# Patient Record
Sex: FEMALE | Race: WHITE | NOT HISPANIC OR LATINO | Employment: FULL TIME | URBAN - METROPOLITAN AREA
[De-identification: names, ages, dates, MRNs, and addresses within clinical notes are randomized per-mention and may not be internally consistent; named-entity substitution may affect disease eponyms.]

---

## 2017-09-13 ENCOUNTER — TELEPHONE (OUTPATIENT)
Dept: HEMATOLOGY/ONCOLOGY | Facility: CLINIC | Age: 52
End: 2017-09-13

## 2017-09-13 NOTE — TELEPHONE ENCOUNTER
----- Message from Yue Mark sent at 9/13/2017  8:09 AM CDT -----  Patient called in stating that she doesn't have an appointment until 1/15/18. It is in allscripts but not in Epic. She noticed a lump on the right side of her belly button. She doesn't know if she should get in sooner ? There is nothing in [facility] on this patient so I pulled the chart and attached a copy of this to it. Please advise and call patient at [phone]. Thanks

## 2017-09-19 ENCOUNTER — OFFICE VISIT (OUTPATIENT)
Dept: HEMATOLOGY/ONCOLOGY | Facility: CLINIC | Age: 52
End: 2017-09-19
Payer: OTHER GOVERNMENT

## 2017-09-19 VITALS
SYSTOLIC BLOOD PRESSURE: 131 MMHG | DIASTOLIC BLOOD PRESSURE: 76 MMHG | HEART RATE: 96 BPM | RESPIRATION RATE: 18 BRPM | TEMPERATURE: 99 F | WEIGHT: 173 LBS

## 2017-09-19 DIAGNOSIS — Z85.3 HISTORY OF BREAST CANCER: Chronic | ICD-10-CM

## 2017-09-19 DIAGNOSIS — R19.05 PERIUMBILICAL MASS: ICD-10-CM

## 2017-09-19 PROCEDURE — 99214 OFFICE O/P EST MOD 30 MIN: CPT | Mod: ,,, | Performed by: INTERNAL MEDICINE

## 2017-09-19 RX ORDER — MULTIVITAMIN
1 TABLET ORAL DAILY
COMMUNITY

## 2017-09-19 RX ORDER — FERROUS SULFATE, DRIED 160(50) MG
1 TABLET, EXTENDED RELEASE ORAL 2 TIMES DAILY WITH MEALS
COMMUNITY

## 2017-09-19 NOTE — ASSESSMENT & PLAN NOTE
Unsure of cause of this.  Will get ultrasound to better characterize.  Does seem benign to me however.  Will see again in 3 months to examine.

## 2017-09-19 NOTE — ASSESSMENT & PLAN NOTE
Breast exam is negative bilaterally. FREDERICK at this time.  Will continue yearly follow up for this.

## 2017-09-19 NOTE — PROGRESS NOTES
PROGRESS NOTE    Subjective:       Patient ID: Enma Navarro is a 52 y.o. female.    Chief Complaint:  Follow-up (patient has hard knot on center of abd)  new nodule on abdomen    History of Present Illness:   Enma Navarro is a 52 y.o. female who presents after she found a nodule in the area of the umbilicus.  This lesion is not painful and has not been growing.         Family and Social history reviewed and is unchanged from 7/29/2014.       ROS:  Review of Systems   Constitutional: Negative for fever.   Respiratory: Negative for shortness of breath.    Cardiovascular: Negative for chest pain and leg swelling.   Gastrointestinal: Negative for abdominal pain and blood in stool.   Genitourinary: Negative for hematuria.   Skin: Negative for rash.          Current Outpatient Prescriptions:     calcium-vitamin D3 (CALCIUM 500 + D) 500 mg(1,250mg) -200 unit per tablet, Take 1 tablet by mouth 2 (two) times daily with meals., Disp: , Rfl:     multivitamin (ONE DAILY MULTIVITAMIN) per tablet, Take 1 tablet by mouth once daily., Disp: , Rfl:         Objective:       Physical Examination:     /76   Pulse 96   Temp 98.7 °F (37.1 °C) (Oral)   Resp 18   Wt 78.5 kg (173 lb)     Physical Exam   Constitutional: She appears well-developed and well-nourished.   HENT:   Head: Normocephalic and atraumatic.   Right Ear: External ear normal.   Left Ear: External ear normal.   Mouth/Throat: Oropharynx is clear and moist.   Eyes: Conjunctivae are normal. Pupils are equal, round, and reactive to light.   Neck: No tracheal deviation present. No thyromegaly present.   Cardiovascular: Normal rate, regular rhythm and normal heart sounds.    Pulmonary/Chest: Effort normal and breath sounds normal.       Abdominal: Soft. Bowel sounds are normal. She exhibits no distension and no mass. There is no tenderness.       Musculoskeletal: She exhibits no edema.   Neurological:    Neuro intact througout   Skin: No rash noted.   Psychiatric: She has a normal mood and affect. Her behavior is normal. Judgment and thought content normal.       Labs:   No results found for this or any previous visit (from the past 336 hour(s)).  CMP  No results found for: NA, K, CL, CO2, GLU, BUN, CREATININE, CALCIUM, PROT, ALBUMIN, BILITOT, ALKPHOS, AST, ALT, ANIONGAP, ESTGFRAFRICA, EGFRNONAA  No results found for: CEA  No results found for: PSA        Assessment/Plan:     Problem List Items Addressed This Visit     History of breast cancer (Chronic)     Breast exam is negative bilaterally. FREDERICK at this time.  Will continue yearly follow up for this.           Periumbilical mass     Unsure of cause of this.  Will get ultrasound to better characterize.  Does seem benign to me however.  Will see again in 3 months to examine.          Relevant Orders    US Abdomen Limited      Other Visit Diagnoses    None.         Discussion:     Return in about 3 months (around 12/19/2017).      Electronically signed by Delbert Roca

## 2017-09-25 ENCOUNTER — TELEPHONE (OUTPATIENT)
Dept: HEMATOLOGY/ONCOLOGY | Facility: CLINIC | Age: 52
End: 2017-09-25

## 2017-09-25 DIAGNOSIS — R19.05 PERIUMBILICAL MASS: Primary | ICD-10-CM

## 2017-09-25 NOTE — TELEPHONE ENCOUNTER
Message left on VM for pt to give office a call today regarding scan [id number] we close today around 430 pm.

## 2017-09-25 NOTE — TELEPHONE ENCOUNTER
----- Message from Delbert Edgar MD sent at 9/25/2017  9:25 AM CDT -----  Please call the patient regarding her abnormal result. 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

## 2017-09-25 NOTE — TELEPHONE ENCOUNTER
Spoke to patient with 's evaluation of the U/S she had done today. A ct is recommended. She is agreeable. Will set up first available.    09/26/2017  placed order for CT to be done. Order sent. [facility] will call patient to schedule.

## 2017-09-29 ENCOUNTER — TELEPHONE (OUTPATIENT)
Dept: HEMATOLOGY/ONCOLOGY | Facility: CLINIC | Age: 52
End: 2017-09-29

## 2017-09-29 NOTE — TELEPHONE ENCOUNTER
----- Message from Delbert Edgar MD sent at 9/29/2017  3:07 PM CDT -----  Please call patient and notify that results are unremarkable. Looks like benign fatty tumor.

## 2017-09-29 NOTE — TELEPHONE ENCOUNTER
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 Keep follow up appt as scheduled in Dec. 2017. Patient voiced understanding

## 2018-02-06 ENCOUNTER — OFFICE VISIT (OUTPATIENT)
Dept: HEMATOLOGY/ONCOLOGY | Facility: CLINIC | Age: 53
End: 2018-02-06
Payer: OTHER GOVERNMENT

## 2018-02-06 VITALS
SYSTOLIC BLOOD PRESSURE: 123 MMHG | WEIGHT: 172.69 LBS | TEMPERATURE: 98 F | HEART RATE: 90 BPM | HEIGHT: 63 IN | RESPIRATION RATE: 18 BRPM | BODY MASS INDEX: 30.6 KG/M2 | DIASTOLIC BLOOD PRESSURE: 86 MMHG

## 2018-02-06 DIAGNOSIS — R19.05 PERIUMBILICAL MASS: ICD-10-CM

## 2018-02-06 DIAGNOSIS — Z85.3 HISTORY OF BREAST CANCER: Chronic | ICD-10-CM

## 2018-02-06 PROCEDURE — 3008F BODY MASS INDEX DOCD: CPT | Mod: ,,, | Performed by: INTERNAL MEDICINE

## 2018-02-06 PROCEDURE — 99213 OFFICE O/P EST LOW 20 MIN: CPT | Mod: ,,, | Performed by: INTERNAL MEDICINE

## 2018-02-06 NOTE — LETTER
February 6, 2018      Rosa Edgar MD  [address]  [address]  [place] [address]           [facility]  [address]  [address]  [place] [address]  Phone: [phone]  Fax: [phone]          Patient: Enma Navarro   MR Number: [id number]   YOB: 1965   Date of Visit: 2/6/2018       Dear Dr. Rosa Edgar:    Thank you for referring Enma Navarro to me for evaluation. Attached you will find relevant portions of my assessment and plan of care.    If you have questions, please do not hesitate to call me. I look forward to following Enma Navarro along with you.    Sincerely,    Delbert Edgar MD    Enclosure  CC:  No Recipients    If you would like to receive this communication electronically, please contact [email or web address] or [phone] to request more information on [employer] access.    For providers and/or their staff who would like to refer a patient to [facility], please contact us through our one-stop-shop provider referral line, [facility], at [phone].    If you feel you have received this communication in error or would no longer like to receive these types of communications, please e-mail [email or web address]

## 2018-02-06 NOTE — PROGRESS NOTES
"                                                         PROGRESS NOTE    Subjective:       Patient ID: Enma Navarro is a 52 y.o. female.    Chief Complaint:  Follow-up and Results (scan in epic)  breast cancer follow up.     History of Present Illness:   Enma Navarro is a 52 y.o. female who presents for routine follow up of breast cancer. Has a small lump at umbilicus.     Ultrasound and CT were negative. No changes. No new complaints.   Family and Social history reviewed and is unchanged from 7/29/2014.       ROS:  Review of Systems   Constitutional: Negative for fever.   Respiratory: Negative for shortness of breath.    Cardiovascular: Negative for chest pain and leg swelling.   Gastrointestinal: Negative for abdominal pain and blood in stool.   Genitourinary: Negative for hematuria.   Skin: Negative for rash.          Current Outpatient Prescriptions:     calcium-vitamin D3 (CALCIUM 500 + D) 500 mg(1,250mg) -200 unit per tablet, Take 1 tablet by mouth 2 (two) times daily with meals., Disp: , Rfl:     multivitamin (ONE DAILY MULTIVITAMIN) per tablet, Take 1 tablet by mouth once daily., Disp: , Rfl:         Objective:       Physical Examination:     /86   Pulse 90   Temp 98.4 °F (36.9 °C)   Resp 18   Ht 5' 3" (1.6 m)   Wt 78.3 kg (172 lb 11.2 oz)   BMI 30.59 kg/m²     Physical Exam   Constitutional: She appears well-developed and well-nourished.   HENT:   Head: Normocephalic and atraumatic.   Right Ear: External ear normal.   Left Ear: External ear normal.   Mouth/Throat: Oropharynx is clear and moist.   Eyes: Conjunctivae are normal. Pupils are equal, round, and reactive to light.   Neck: No tracheal deviation present. No thyromegaly present.   Cardiovascular: Normal rate, regular rhythm and normal heart sounds.    Pulmonary/Chest: Effort normal and breath sounds normal.       Abdominal: Soft. Bowel sounds are normal. She exhibits no distension and no mass. There is no tenderness. "       Musculoskeletal: She exhibits no edema.   Neurological:   Neuro intact througout   Skin: No rash noted.   Psychiatric: She has a normal mood and affect. Her behavior is normal. Judgment and thought content normal.       Labs:   No results found for this or any previous visit (from the past 336 hour(s)).  CMP  No results found for: NA, K, CL, CO2, GLU, BUN, CREATININE, CALCIUM, PROT, ALBUMIN, BILITOT, ALKPHOS, AST, ALT, ANIONGAP, ESTGFRAFRICA, EGFRNONAA  No results found for: CEA  No results found for: PSA        Assessment/Plan:     Problem List Items Addressed This Visit     History of breast cancer (Chronic)     Patient is doing well.  FREDERICK, exam is negative.  Will continue yearly follow up otherwise.          Periumbilical mass     Work up was negative. Observe.                Discussion:     Follow-up in about 1 year (around 2/6/2019).      Electronically signed by Delbert Roca

## 2018-08-07 ENCOUNTER — OFFICE VISIT (OUTPATIENT)
Dept: FAMILY MEDICINE | Facility: CLINIC | Age: 53
End: 2018-08-07
Payer: OTHER GOVERNMENT

## 2018-08-07 VITALS
RESPIRATION RATE: 16 BRPM | SYSTOLIC BLOOD PRESSURE: 136 MMHG | DIASTOLIC BLOOD PRESSURE: 80 MMHG | HEIGHT: 63 IN | OXYGEN SATURATION: 97 % | WEIGHT: 175.5 LBS | HEART RATE: 84 BPM | BODY MASS INDEX: 31.1 KG/M2

## 2018-08-07 DIAGNOSIS — Z12.11 SCREEN FOR COLON CANCER: ICD-10-CM

## 2018-08-07 DIAGNOSIS — R53.83 FATIGUE, UNSPECIFIED TYPE: ICD-10-CM

## 2018-08-07 DIAGNOSIS — E28.319 PREMATURE MENOPAUSE: ICD-10-CM

## 2018-08-07 DIAGNOSIS — Z12.39 SCREENING FOR BREAST CANCER: ICD-10-CM

## 2018-08-07 DIAGNOSIS — Z00.00 ANNUAL PHYSICAL EXAM: Primary | ICD-10-CM

## 2018-08-07 DIAGNOSIS — Z85.3 HISTORY OF BREAST CANCER: Chronic | ICD-10-CM

## 2018-08-07 PROBLEM — Z90.710 HISTORY OF TOTAL HYSTERECTOMY: Status: ACTIVE | Noted: 2018-08-07

## 2018-08-07 PROCEDURE — 99396 PREV VISIT EST AGE 40-64: CPT | Mod: ,,, | Performed by: INTERNAL MEDICINE

## 2018-08-07 NOTE — PATIENT INSTRUCTIONS
Bone Density Study     The technologist will adjust your body and the scanner during the exam.     A bone density study helps diagnose osteoporosis (bone thinning). Scans of your lower back, hip, or forearm are taken to measure the amount of calcium (density) in your bones. Calcium is the mineral that makes up your bones.     Tell the technologist   Tell the technologist if you:  · Are pregnant or think you could be  · Have any metal in the part of your body being imaged, such as a hip replacement  · Have had a recent nuclear medicine scan,  CT scan with oral contrast, or an X-ray test with oral contrast, such as a barium enema, barium swallow, or upper GI  · Have a severely curved spine, have had spinal surgery, have a history of spinal or hip fractures, or cant lie on your back      Before your test  · Wear clothing without metal closures, such as zippers or metal buttons.  · Bring a list of medications that you take.  During your test  · You will lie on a table or sit.  · Your lower legs may be raised on a platform.  · A scanner arm moves back and forth over the part of your body being scanned.  · Remain still and do not talk during the scan.  · Follow instructions to help prevent the need for a second exam.  After your test  · You may need to wait briefly while the images are reviewed.  · Your healthcare provider will discuss the test results with you during a follow-up visit or over the phone.  Your next appointment is: _________________  Date Last Reviewed: 2/1/2017  © 2174-1826 [employer]. [address] [place] [address], [place] [address]. All rights reserved. This information is not intended as a substitute for professional medical care. Always follow your healthcare professional's instructions.

## 2018-08-07 NOTE — PROGRESS NOTES
Subjective:       Patient ID: Enma Navarro is a 53 y.o. female.    Chief Complaint: Annual Exam    53-year-old female who is here for an annual physical exam. The last time I saw her was 2 years ago. She is very healthy but has a history of breast cancer, and early stages that was estrogen positive back in 2008. She had a bilateral mastectomy with abdominal fat used as breast. However her nipples are still present. She sees her oncologist every year does a breast exam. She hasn't had a mammogram in quite some time and our records have 2012. She also had a complete total hysterectomy in 2010. She has not yet had a colonoscopy and is interested in having one as well. For her breast cancer she was on 5 years of first tamoxifen and then Arimidex, but she has never had a bone mineral density either. She has no complaints today and does take a multivitamin with calcium and vitamin D. She has no family history of colon cancer.      Review of Systems   Constitutional: Negative for activity change, appetite change, chills, diaphoresis, fatigue, fever and unexpected weight change.   HENT: Negative for congestion, ear pain, mouth sores, postnasal drip, sinus pressure, sore throat and trouble swallowing.    Eyes: Negative for pain, redness and visual disturbance.   Respiratory: Negative for apnea, cough, chest tightness, shortness of breath and wheezing.    Cardiovascular: Negative for chest pain, palpitations and leg swelling.   Gastrointestinal: Negative for abdominal distention, abdominal pain, blood in stool, constipation, diarrhea, nausea and vomiting.   Endocrine: Negative for cold intolerance, polydipsia, polyphagia and polyuria.   Genitourinary: Negative for difficulty urinating, dysuria, flank pain, frequency, hematuria, menstrual problem, pelvic pain and urgency.   Musculoskeletal: Negative for arthralgias, back pain, joint swelling and neck pain.   Skin: Negative for color change, rash and wound.   Neurological:  Negative for dizziness, tremors, seizures, syncope, weakness, light-headedness, numbness and headaches.   Hematological: Negative for adenopathy. Does not bruise/bleed easily.   Psychiatric/Behavioral: Negative for confusion, decreased concentration, dysphoric mood, hallucinations, self-injury, sleep disturbance and suicidal ideas. The patient is not nervous/anxious.        Past Medical History:   Diagnosis Date    Allergy     Cancer     breast cancer       Past Surgical History:   Procedure Laterality Date    BREAST SURGERY      double mastectomy w/reconstruction     SECTION      2 c-sections    FINGER SURGERY      left index finger    HYSTERECTOMY      TONSILLECTOMY         Family History   Problem Relation Age of Onset    Stroke Mother     Depression Mother     Hypertension Father        Social History     Social History    Marital status:      Spouse name: N/A    Number of children: N/A    Years of education: N/A     Social History Main Topics    Smoking status: Never Smoker    Smokeless tobacco: Never Used    Alcohol use No    Drug use: No    Sexual activity: Yes     Other Topics Concern    None     Social History Narrative    None       Current Outpatient Prescriptions   Medication Sig Dispense Refill    calcium-vitamin D3 (CALCIUM 500 + D) 500 mg(1,250mg) -200 unit per tablet Take 1 tablet by mouth 2 (two) times daily with meals.      ergocalciferol, vitamin D2, (VITAMIN D ORAL) Take by mouth.      multivitamin (ONE DAILY MULTIVITAMIN) per tablet Take 1 tablet by mouth once daily.       No current facility-administered medications for this visit.        Review of patient's allergies indicates:   Allergen Reactions    Penicillins        Objective:      Physical Exam   Constitutional: She is oriented to person, place, and time. She appears well-developed and well-nourished.   HENT:   Head: Normocephalic and atraumatic.   Eyes: Right eye exhibits no discharge. Left eye  exhibits no discharge. No scleral icterus.   Neck: Neck supple. No JVD present.   Cardiovascular: Normal rate and regular rhythm.  Exam reveals no gallop and no friction rub.    No murmur heard.  Pulmonary/Chest: Effort normal and breath sounds normal. She has no wheezes. She has no rales.   Abdominal: Soft. Bowel sounds are normal. She exhibits no distension. There is no tenderness.   Musculoskeletal: She exhibits no edema or tenderness.   Lymphadenopathy:     She has no cervical adenopathy.   Neurological: She is alert and oriented to person, place, and time. She has normal reflexes. No cranial nerve deficit.   Skin: No rash noted. No erythema.   Psychiatric: She has a normal mood and affect.   Nursing note and vitals reviewed.      Assessment:       1. Annual physical exam    2. Premature menopause    3. History of breast cancer-2008 ; s/p bilateral masectomies with reconstruction form abdominal fat-s/p tamifen and arimedex    4. Screening for breast cancer    5. Screen for colon cancer    6. Fatigue, unspecified type        Plan:       Annual physical exam  -     Comprehensive metabolic panel; Future; Expected date: 08/07/2018  -     Lipid panel; Future; Expected date: 08/07/2018  -     Cancel: TSH; Future; Expected date: 08/07/2018    Premature menopause  -     DXA Bone Density Spine And Hip    History of breast cancer-2008 ; s/p bilateral masectomies with reconstruction form abdominal fat-s/p tamifen and arimedex-We will get a screening mammogram as her breast tissue is all abdominal fat and she is over 12 years out. The only reason to get one at all she still has her nipples and areola    Screening for breast cancer  -     Mammo Digital Screening Bilat without CA    Screen for colon cancer  -     Cologuard Screening (Multitarget Stool DNA)    Fatigue, unspecified type  -     CBC auto differential; Future; Expected date: 08/07/2018  -     Cancel: TSH; Future; Expected date: 08/07/2018  -     TSH; Future;  Expected date: 08/07/2018

## 2018-08-13 ENCOUNTER — TELEPHONE (OUTPATIENT)
Dept: FAMILY MEDICINE | Facility: CLINIC | Age: 53
End: 2018-08-13

## 2018-08-13 NOTE — TELEPHONE ENCOUNTER
----- Message from Rosa Edgar MD sent at 8/13/2018  1:48 PM CDT -----  Osteoporosis on bmd - at 53 , let her make ov to discuss

## 2018-08-16 ENCOUNTER — OFFICE VISIT (OUTPATIENT)
Dept: FAMILY MEDICINE | Facility: CLINIC | Age: 53
End: 2018-08-16
Payer: OTHER GOVERNMENT

## 2018-08-16 VITALS
BODY MASS INDEX: 30.27 KG/M2 | RESPIRATION RATE: 16 BRPM | SYSTOLIC BLOOD PRESSURE: 132 MMHG | WEIGHT: 170.81 LBS | HEIGHT: 63 IN | OXYGEN SATURATION: 97 % | HEART RATE: 94 BPM | DIASTOLIC BLOOD PRESSURE: 88 MMHG

## 2018-08-16 DIAGNOSIS — E55.9 VITAMIN D DEFICIENCY: ICD-10-CM

## 2018-08-16 DIAGNOSIS — M70.61 TROCHANTERIC BURSITIS, RIGHT HIP: ICD-10-CM

## 2018-08-16 DIAGNOSIS — M81.0 OSTEOPOROSIS, UNSPECIFIED OSTEOPOROSIS TYPE, UNSPECIFIED PATHOLOGICAL FRACTURE PRESENCE: ICD-10-CM

## 2018-08-16 DIAGNOSIS — Z12.11 SCREEN FOR COLON CANCER: ICD-10-CM

## 2018-08-16 DIAGNOSIS — K21.9 GASTROESOPHAGEAL REFLUX DISEASE, ESOPHAGITIS PRESENCE NOT SPECIFIED: Primary | ICD-10-CM

## 2018-08-16 PROCEDURE — 99214 OFFICE O/P EST MOD 30 MIN: CPT | Mod: ,,, | Performed by: INTERNAL MEDICINE

## 2018-08-16 RX ORDER — CALCITONIN SALMON 200 [IU]/.09ML
1 SPRAY, METERED NASAL DAILY
Qty: 1 BOTTLE | Refills: 11 | Status: SHIPPED | OUTPATIENT
Start: 2018-08-16 | End: 2021-01-26

## 2018-08-16 RX ORDER — OMEPRAZOLE 20 MG/1
20 CAPSULE, DELAYED RELEASE ORAL DAILY
Qty: 30 CAPSULE | Refills: 5 | Status: SHIPPED | OUTPATIENT
Start: 2018-08-16 | End: 2021-01-26

## 2018-08-16 NOTE — PATIENT INSTRUCTIONS
What Is a Hiatal Hernia?    Hiatal hernia is when the area where the stomach and esophagus meet bulges up through the diaphragm into the chest cavity. In some cases, part of the stomach may bulge above the diaphragm. Stomach acid may move up into the esophagus and cause symptoms. The symptoms are often blamed on gastroesophageal reflux disease (GERD). You may only know about the hernia when it shows up on an X-ray taken for other reasons.   What you may feel  The hiatus is a normal hole in the diaphragm. The esophagus passes through this hole and leads to the stomach. In some cases, part of the stomach may bulge above the diaphragm. This bulge is called a hernia. Stomach acid may move up into the esophagus and cause symptoms.  When you eat, the muscle at the hiatus relaxes to allow food to pass into the stomach. It tightens again to keep food and digestive acids in the stomach.  Many people with hiatal hernias have mild symptoms. You may notice the following GERD symptoms:  · Heartburn or other chest discomfort  · A feeling of chest fullness after a meal  · Frequent burping  · Acid taste in the mouth  · Trouble swallowing  Treating symptoms  If you have been diagnosed with hiatal hernia, these suggestions may help improve symptoms:  · Lose excess weight. Extra weight puts pressure on the stomach and esophagus.  · Dont lie down after eating. Sit up for at least an hour after eating. Lying down after eating can increase symptoms.  · Avoid certain foods and drinks. These include fatty foods, chocolate, coffee, mint, and other foods that cause symptoms for you.  · Dont smoke or drink alcohol. These can worsen symptoms.  · Look at your medicines. Discuss your medicines with your healthcare provider. Many medicines can cause symptoms.  · Consider an antacid medicine. Ask your healthcare provider about over-the-counter and prescription medicines that may help.  · Ask about surgery, if needed. Surgery is a treatment  choice for some people. Your healthcare provider can determine if surgery is an option for you.    Date Last Reviewed: 10/1/2016  © 2168-0845 [employer]. [address] [place] [address], [place] [address]. All rights reserved. This information is not intended as a substitute for professional medical care. Always follow your healthcare professional's instructions.        Understanding Trochanteric Bursitis    A bursa is a thin, slippery, sac-like film. It contains a small amount of fluid. This structure is found between bones and soft tissues in and around joints. A bursa cushions and protects a joint. It keeps parts of a joint from rubbing against each other. If a bursa becomes inflamed and irritated, it is known as bursitis.  The trochanteric bursa is found on the hip joint. It lies on top of the bump at the top of the thighbone called the greater trochanter. Inflammation of this bursa is called trochanteric bursitis.     How to say it  [id number]   Causes of trochanteric bursitis  Causes may include:  · Overuse of the hip during running or other sports, dance, or work  · Falling on or irritation to the side of the hip  This condition may occur along with other problems, such as osteoarthritis of the hip or knee, or low back problems. In rare cases, it may occur after hip surgery.  Symptoms of trochanteric bursitis  · Pain or aching on the side of the hip. The pain may travel down the leg.  · Swelling, tenderness, or warmth on the side of the hip at the bony bump at the top of the thigh  Treatment for trochanteric bursitis  These may include:  · Resting the hip. This allows the bursa to heal.  · Prescription or over-the-counter pain medicines. These help reduce inflammation, swelling, and pain.  · Cold packs and heat packs. These help reduce pain and swelling.  · Stretching and strengthening exercises. These improve flexibility and strength around the hip.  · Physical therapy. This includes exercises or  other treatments.  · Injections of medicine into the bursa. This may help reduce inflammation and relieve symptoms.  Possible complications  If you dont give your hip time to heal, the problem may not go away, may return, or may get worse. Rest and treat your hip as directed.     When to call your healthcare provider  Call your healthcare provider right away if you have any of these:  · Fever of 100.4°F (38°C) or higher, or as directed  · Redness, swelling, or warmth that gets worse  · Symptoms that dont get better with prescribed medicines, or get worse  · New symptoms   Date Last Reviewed: 3/29/2016  © 2425-9097 [employer]. [address] [place] [address], [place] [address]. All rights reserved. This information is not intended as a substitute for professional medical care. Always follow your healthcare professional's instructions.

## 2018-08-16 NOTE — PROGRESS NOTES
Subjective:       Patient ID: Enma Navarro is a 53 y.o. female.    Chief Complaint: Follow-up (Bone Density Test results); Results; and Gastroesophageal Reflux    53 yr old had an episode of severe gastroesophageal reflux disease. This has happened in spells over the past two years or so. The acid comes up quite high into her mouth at times especially at night. She also went to the ER as the pain in the chest was so bad. So she took a zantac of her sons and her tongue started to numbness and to some extent swollen, mostly on the right side.  Most of the food she eats to set off the by certain foods such as popcorn and beans. She had been taking a lot of ibuprofen for right-sided hip pain where she finds it difficult to sleep on that side at night. She is in the gym with her exercise regimen and the right-sided hip pain hurts more with exercise as well as that evening when trying to sleep.        Review of Systems   Constitutional: Negative for activity change, appetite change, chills, diaphoresis, fatigue, fever and unexpected weight change.   HENT: Negative for congestion, ear pain, mouth sores, postnasal drip, sinus pressure, sore throat and trouble swallowing.    Eyes: Negative for pain, redness and visual disturbance.   Respiratory: Negative for apnea, cough, chest tightness, shortness of breath and wheezing.    Cardiovascular: Negative for chest pain, palpitations and leg swelling.   Gastrointestinal: Positive for abdominal distention. Negative for abdominal pain, blood in stool, constipation, diarrhea, nausea and vomiting.        Mild epigastric discomfort   Endocrine: Negative for cold intolerance, polydipsia, polyphagia and polyuria.   Genitourinary: Negative for difficulty urinating, dysuria, flank pain, frequency, hematuria, menstrual problem, pelvic pain and urgency.   Musculoskeletal: Positive for arthralgias. Negative for back pain, joint swelling and neck pain.   Skin: Negative for color change,  rash and wound.   Neurological: Negative for dizziness, tremors, seizures, syncope, weakness, light-headedness, numbness and headaches.   Hematological: Negative for adenopathy. Does not bruise/bleed easily.   Psychiatric/Behavioral: Negative for confusion, decreased concentration, dysphoric mood, hallucinations, self-injury, sleep disturbance and suicidal ideas. The patient is not nervous/anxious.        Past Medical History:   Diagnosis Date    Allergy     Cancer     breast cancer       Past Surgical History:   Procedure Laterality Date    BREAST SURGERY      double mastectomy w/reconstruction     SECTION      2 c-sections    FINGER SURGERY      left index finger    HYSTERECTOMY      TONSILLECTOMY         Family History   Problem Relation Age of Onset    Stroke Mother     Depression Mother     Hypertension Father        Social History     Socioeconomic History    Marital status:      Spouse name: None    Number of children: None    Years of education: None    Highest education level: None   Social Needs    Financial resource strain: None    Food insecurity - worry: None    Food insecurity - inability: None    Transportation needs - medical: None    Transportation needs - non-medical: None   Occupational History    None   Tobacco Use    Smoking status: Never Smoker    Smokeless tobacco: Never Used   Substance and Sexual Activity    Alcohol use: No    Drug use: No    Sexual activity: Yes   Other Topics Concern    None   Social History Narrative    None       Current Outpatient Medications   Medication Sig Dispense Refill    calcium-vitamin D3 (CALCIUM 500 + D) 500 mg(1,250mg) -200 unit per tablet Take 1 tablet by mouth 2 (two) times daily with meals.      ergocalciferol, vitamin D2, (VITAMIN D ORAL) Take by mouth.      multivitamin (ONE DAILY MULTIVITAMIN) per tablet Take 1 tablet by mouth once daily.      calcitonin, salmon, (FORTICAL) 200 unit/actuation nasal spray 1  spray by Nasal route once daily. 1 Bottle 11    omeprazole (PRILOSEC) 20 MG capsule Take 1 capsule (20 mg total) by mouth once daily. 30 capsule 5     No current facility-administered medications for this visit.        Review of patient's allergies indicates:   Allergen Reactions    Penicillins        Objective:     Physical Exam   Constitutional: She is oriented to person, place, and time. She appears well-developed and well-nourished.   HENT:   Head: Normocephalic and atraumatic.   Eyes: Right eye exhibits no discharge. Left eye exhibits no discharge. No scleral icterus.   Neck: Neck supple. No JVD present.   Cardiovascular: Normal rate and regular rhythm. Exam reveals no gallop and no friction rub.   No murmur heard.  Pulmonary/Chest: Effort normal and breath sounds normal. She has no wheezes. She has no rales.   Abdominal: Soft. Epigastric tenderness to palpation Bowel sounds are normal. She exhibits no distension. There is no tenderness.   Musculoskeletal: She exhibits no edema or tenderness. right trochanteric point tenderness  Lymphadenopathy:     She has no cervical adenopathy.   Neurological: She is alert and oriented to person, place, and time. She has normal reflexes. No cranial nerve deficit.   Skin: No rash noted. No erythema.   Psychiatric: She has a normal mood and affect.   Nursing note and vitals reviewed.    Assessment:       1. Gastroesophageal reflux disease, esophagitis presence not specified    2. Osteoporosis, unspecified osteoporosis type, unspecified pathological fracture presence    3. Vitamin D deficiency    4. Screen for colon cancer    5. Trochanteric bursitis, right hip        Plan:       Gastroesophageal reflux disease, esophagitis presence not specified-positive for hiatal hernia and exacerbated by ibuprofen  -     omeprazole (PRILOSEC) 20 MG capsule; Take 1 capsule (20 mg total) by mouth once daily.  Dispense: 30 capsule; Refill: 5  -     Ambulatory referral to  Gastroenterology    Osteoporosis, unspecified osteoporosis type, unspecified pathological fracture presence-with a T score of -2.4 at the vertebral spine. The patient had been told many years ago by her OB/GYN that she had vitamin D deficiency. She does try to take calcium and vitamin D supplements but sometimes forgets she is not on any other medications.-We'll assure adequate vitamin D and calcium. Did refuse bisphosphonates but with her epigastric discomfort at present, will defer at this time.  -     calcitonin, salmon, (FORTICAL) 200 unit/actuation nasal spray; 1 spray by Nasal route once daily.  Dispense: 1 Bottle; Refill: 11    Vitamin D deficiency  -     Vitamin D; Future; Expected date: 08/17/2018    Screen for colon cancer  -     Ambulatory referral to Gastroenterology    Trochanteric bursitis, right hip-occurring for about the past few weeks with some relief with ibuprofen at night.  -     Ambulatory referral to Orthopedics

## 2018-08-20 ENCOUNTER — TELEPHONE (OUTPATIENT)
Dept: GASTROENTEROLOGY | Facility: CLINIC | Age: 53
End: 2018-08-20

## 2018-08-28 LAB
ALBUMIN SERPL-MCNC: 4.5 G/DL (ref 3.6–5.1)
ALBUMIN/GLOB SERPL: 1.8 (CALC) (ref 1–2.5)
ALP SERPL-CCNC: 69 U/L (ref 33–130)
ALT SERPL-CCNC: 25 U/L (ref 6–29)
AST SERPL-CCNC: 23 U/L (ref 10–35)
BASOPHILS # BLD AUTO: 30 CELLS/UL (ref 0–200)
BASOPHILS NFR BLD AUTO: 0.8 %
BILIRUB SERPL-MCNC: 0.5 MG/DL (ref 0.2–1.2)
BUN SERPL-MCNC: 15 MG/DL (ref 7–25)
BUN/CREAT SERPL: NORMAL (CALC) (ref 6–22)
CALCIUM SERPL-MCNC: 9.7 MG/DL (ref 8.6–10.4)
CHLORIDE SERPL-SCNC: 107 MMOL/L (ref 98–110)
CHOLEST SERPL-MCNC: 237 MG/DL
CHOLEST/HDLC SERPL: 5.8 (CALC)
CO2 SERPL-SCNC: 25 MMOL/L (ref 20–32)
CREAT SERPL-MCNC: 0.69 MG/DL (ref 0.5–1.05)
EOSINOPHIL # BLD AUTO: 93 CELLS/UL (ref 15–500)
EOSINOPHIL NFR BLD AUTO: 2.5 %
ERYTHROCYTE [DISTWIDTH] IN BLOOD BY AUTOMATED COUNT: 12.8 % (ref 11–15)
GFR SERPL CREATININE-BSD FRML MDRD: 101 ML/MIN/1.73M2
GLOBULIN SER CALC-MCNC: 2.5 G/DL (CALC) (ref 1.9–3.7)
GLUCOSE SERPL-MCNC: 92 MG/DL (ref 65–99)
HCT VFR BLD AUTO: 41.1 % (ref 35–45)
HDLC SERPL-MCNC: 41 MG/DL
HGB BLD-MCNC: 14.2 G/DL (ref 11.7–15.5)
LDLC SERPL CALC-MCNC: 164 MG/DL (CALC)
LYMPHOCYTES # BLD AUTO: 1214 CELLS/UL (ref 850–3900)
LYMPHOCYTES NFR BLD AUTO: 32.8 %
MCH RBC QN AUTO: 30.1 PG (ref 27–33)
MCHC RBC AUTO-ENTMCNC: 34.5 G/DL (ref 32–36)
MCV RBC AUTO: 87.1 FL (ref 80–100)
MONOCYTES # BLD AUTO: 426 CELLS/UL (ref 200–950)
MONOCYTES NFR BLD AUTO: 11.5 %
NEUTROPHILS # BLD AUTO: 1939 CELLS/UL (ref 1500–7800)
NEUTROPHILS NFR BLD AUTO: 52.4 %
NONHDLC SERPL-MCNC: 196 MG/DL (CALC)
PLATELET # BLD AUTO: 188 THOUSAND/UL (ref 140–400)
PMV BLD REES-ECKER: 10.5 FL (ref 7.5–12.5)
POTASSIUM SERPL-SCNC: 4.4 MMOL/L (ref 3.5–5.3)
PROT SERPL-MCNC: 7 G/DL (ref 6.1–8.1)
RBC # BLD AUTO: 4.72 MILLION/UL (ref 3.8–5.1)
SODIUM SERPL-SCNC: 142 MMOL/L (ref 135–146)
TRIGL SERPL-MCNC: 167 MG/DL
TSH SERPL-ACNC: 1.24 MIU/L
WBC # BLD AUTO: 3.7 THOUSAND/UL (ref 3.8–10.8)

## 2018-09-04 ENCOUNTER — TELEPHONE (OUTPATIENT)
Dept: FAMILY MEDICINE | Facility: CLINIC | Age: 53
End: 2018-09-04

## 2018-09-04 NOTE — TELEPHONE ENCOUNTER
----- Message from Rosa Edgar MD sent at 9/1/2018  2:51 PM CDT -----  Cholesterol not too good , make ov to discuss or let us start a statin?

## 2018-09-19 ENCOUNTER — INITIAL CONSULT (OUTPATIENT)
Dept: GASTROENTEROLOGY | Facility: CLINIC | Age: 53
End: 2018-09-19
Payer: OTHER GOVERNMENT

## 2018-09-19 VITALS
HEART RATE: 84 BPM | WEIGHT: 169.56 LBS | DIASTOLIC BLOOD PRESSURE: 79 MMHG | BODY MASS INDEX: 30.04 KG/M2 | SYSTOLIC BLOOD PRESSURE: 127 MMHG | HEIGHT: 63 IN

## 2018-09-19 DIAGNOSIS — Z12.11 SCREEN FOR COLON CANCER: ICD-10-CM

## 2018-09-19 DIAGNOSIS — K21.9 GASTROESOPHAGEAL REFLUX DISEASE, ESOPHAGITIS PRESENCE NOT SPECIFIED: Primary | ICD-10-CM

## 2018-09-19 DIAGNOSIS — Z85.3 HISTORY OF BREAST CANCER: Chronic | ICD-10-CM

## 2018-09-19 DIAGNOSIS — Z90.710 HISTORY OF TOTAL HYSTERECTOMY: ICD-10-CM

## 2018-09-19 PROBLEM — R19.05 PERIUMBILICAL MASS: Status: RESOLVED | Noted: 2017-09-19 | Resolved: 2018-09-19

## 2018-09-19 PROCEDURE — 99245 OFF/OP CONSLTJ NEW/EST HI 55: CPT | Mod: S$GLB,,, | Performed by: INTERNAL MEDICINE

## 2018-09-19 PROCEDURE — 99999 PR PBB SHADOW E&M-EST. PATIENT-LVL III: CPT | Mod: PBBFAC,,, | Performed by: INTERNAL MEDICINE

## 2018-09-19 NOTE — H&P (VIEW-ONLY)
"Subjective:       Patient ID: Enma Navarro is a 53 y.o. female.    This patient is new to me.  Referring MD:  Dr. Edgar for GERD.      Chief Complaint: Gastroesophageal Reflux    Gastroesophageal Reflux   She complains of heartburn and nausea. She reports no abdominal pain, no chest pain, no coughing, no dysphagia, no sore throat or no wheezing. This is a new problem. The current episode started more than 1 year ago (Had a remote episode a few years ago and now has had recurrent episode). Episode frequency: intermittently. The problem has been waxing and waning. Heartburn duration: variable. The heartburn is of moderate intensity. The heartburn does not wake her from sleep. The heartburn does not limit her activity. The heartburn changes with position. The symptoms are aggravated by certain foods and lying down. Associated symptoms include weight loss. Pertinent negatives include no anemia, fatigue or melena. There are no known risk factors. She has tried a PPI (taking after breakfast) for the symptoms. The treatment provided moderate relief. Past procedures do not include an EGD. No history of colonoscopy in the past.   Patient states that after one of her episodes, she had occurrence of "tongue swelling" but this resolved.  She denies hematemesis, weight loss, or change in bowel habits.  Per notes from Dr. Edgar, she has not had colon cancer screening.    Review of Systems   Constitutional: Positive for weight loss. Negative for chills, fatigue and fever.   HENT: Negative for sore throat and trouble swallowing.    Respiratory: Negative for cough, shortness of breath and wheezing.    Cardiovascular: Negative for chest pain and palpitations.   Gastrointestinal: Positive for heartburn and nausea. Negative for abdominal pain, blood in stool, dysphagia, melena and vomiting.   Genitourinary: Negative for dysuria and hematuria.   Musculoskeletal: Negative for arthralgias and myalgias.   Skin: Negative for color " "change and rash.   Neurological: Negative for dizziness and headaches.   Hematological: Negative for adenopathy.   Psychiatric/Behavioral: Negative for confusion. The patient is not nervous/anxious.    All other systems reviewed and are negative.      Objective:       Vitals:    09/19/18 1506   BP: 127/79   Pulse: 84   Weight: 76.9 kg (169 lb 8.5 oz)   Height: 5' 3" (1.6 m)       Physical Exam   Constitutional: She appears well-developed and well-nourished.   HENT:   Head: Normocephalic and atraumatic.   Eyes: Pupils are equal, round, and reactive to light. No scleral icterus.   Neck: Normal range of motion.   Cardiovascular: Normal rate and regular rhythm.   No murmur heard.  Pulmonary/Chest: Effort normal and breath sounds normal. She has no wheezes.   Abdominal: Soft. Bowel sounds are normal. She exhibits no distension. There is no tenderness.   Musculoskeletal: She exhibits no edema or tenderness.   Lymphadenopathy:     She has no cervical adenopathy.   Neurological: She is alert.   Skin: Skin is warm and dry. No rash noted.   Vitals reviewed.        Lab Results   Component Value Date    WBC 3.7 (L) 08/27/2018    HGB 14.2 08/27/2018    HCT 41.1 08/27/2018    MCV 87.1 08/27/2018     08/27/2018       CMP  Sodium   Date Value Ref Range Status   08/27/2018 142 135 - 146 mmol/L Final     Potassium   Date Value Ref Range Status   08/27/2018 4.4 3.5 - 5.3 mmol/L Final     Chloride   Date Value Ref Range Status   08/27/2018 107 98 - 110 mmol/L Final     CO2   Date Value Ref Range Status   08/27/2018 25 20 - 32 mmol/L Final     Glucose   Date Value Ref Range Status   08/27/2018 92 65 - 99 mg/dL Final     Comment:                   Fasting reference interval          BUN, Bld   Date Value Ref Range Status   08/27/2018 15 7 - 25 mg/dL Final     Creatinine   Date Value Ref Range Status   08/27/2018 0.69 0.50 - 1.05 mg/dL Final     Comment:     For patients >49 years of age, the reference limit  for Creatinine is " approximately 13% higher for people  identified as -American.          Calcium   Date Value Ref Range Status   08/27/2018 9.7 8.6 - 10.4 mg/dL Final     Total Protein   Date Value Ref Range Status   08/27/2018 7.0 6.1 - 8.1 g/dL Final     Albumin   Date Value Ref Range Status   08/27/2018 4.5 3.6 - 5.1 g/dL Final     Total Bilirubin   Date Value Ref Range Status   08/27/2018 0.5 0.2 - 1.2 mg/dL Final     Alkaline Phosphatase   Date Value Ref Range Status   08/27/2018 69 33 - 130 U/L Final     AST   Date Value Ref Range Status   08/27/2018 23 10 - 35 U/L Final     ALT   Date Value Ref Range Status   08/27/2018 25 6 - 29 U/L Final     eGFR if    Date Value Ref Range Status   08/27/2018 117 > OR = 60 mL/min/1.73m2 Final     eGFR if non    Date Value Ref Range Status   08/27/2018 101 > OR = 60 mL/min/1.73m2 Final       Recent CT scan  was independently visualized and reviewed by me and showed no suspicious findings.      Old records from Dr. Edgar reviewed and are as summarized above in the HPI.        Assessment:       1. Gastroesophageal reflux disease, esophagitis presence not specified    2. Screen for colon cancer    3. History of total hysterectomy-2010    4. History of breast cancer-2008 ; s/p bilateral masectomies with reconstruction form abdominal fat        Plan:       1.  Schedule EGD  2.  Colonoscopy for screening  3.  Antireflux precautions including avoidance of late night eating and lying down soon after eating.     4.  Further recommendations to follow after above.  5.  Avoid NSAIDs  6.  Communication will be sent to the referring MD, Dr. Edgar regarding my assessment and plan on this patient via EPIC.

## 2018-09-19 NOTE — LETTER
September 19, 2018      Rosa Edgar MD  [address]  [address]  [facility] [address]           [facility] Gastroenterology  [address]  [place] [address]  Phone: [phone]          Patient: Enma Navarro   MR Number: [id number]   YOB: 1965   Date of Visit: 9/19/2018       Dear Dr. Rosa Edgar:    Thank you for referring Enma Navarro to me for evaluation. Attached you will find relevant portions of my assessment and plan of care.    If you have questions, please do not hesitate to call me. I look forward to following Enma Navarro along with you.    Sincerely,    Ellis Gutierres MD    Enclosure  CC:  No Recipients    If you would like to receive this communication electronically, please contact [email or web address] or [phone] to request more information on [facility] access.    For providers and/or their staff who would like to refer a patient to [facility], please contact us through our one-stop-shop provider referral line, [facility], at [phone].    If you feel you have received this communication in error or would no longer like to receive these types of communications, please e-mail [email or web address]

## 2018-09-28 ENCOUNTER — TELEPHONE (OUTPATIENT)
Dept: GASTROENTEROLOGY | Facility: CLINIC | Age: 53
End: 2018-09-28

## 2018-09-28 NOTE — TELEPHONE ENCOUNTER
----- Message from Marion Daley sent at 9/28/2018  3:50 PM CDT -----  Contact: [phone]  Patient is requesting a call back from the nurse stated she want to change the date of the procedure.    Please call the patient upon request at phone number [phone].

## 2018-10-09 ENCOUNTER — TELEPHONE (OUTPATIENT)
Dept: GASTROENTEROLOGY | Facility: CLINIC | Age: 53
End: 2018-10-09

## 2018-10-09 NOTE — TELEPHONE ENCOUNTER
----- Message from Shabbir Menendez sent at 10/9/2018  3:23 PM CDT -----  Contact: pt            Name of Who is Calling: pt      What is the request in detail: pt took 1 Aleve on Saturday due to her forgetting she was not suppose to take them 7 days prior to her procedure. Pt is asking will she still be ok to follow through      Can the clinic reply by [facility]: no      What Number to Call Back if not in [facility]: [phone]

## 2018-10-09 NOTE — TELEPHONE ENCOUNTER
Returned call to pt. Informed pt that it was ok for her to proceed with her procedure on 10/11. Instructed pt that to only take Tylenol if needed and increase her fluid intake, pt understands.

## 2018-10-11 ENCOUNTER — ANESTHESIA EVENT (OUTPATIENT)
Dept: ENDOSCOPY | Facility: HOSPITAL | Age: 53
End: 2018-10-11
Payer: OTHER GOVERNMENT

## 2018-10-11 ENCOUNTER — HOSPITAL ENCOUNTER (OUTPATIENT)
Facility: HOSPITAL | Age: 53
Discharge: HOME | End: 2018-10-11
Attending: INTERNAL MEDICINE | Admitting: INTERNAL MEDICINE
Payer: OTHER GOVERNMENT

## 2018-10-11 ENCOUNTER — ANESTHESIA (OUTPATIENT)
Dept: ENDOSCOPY | Facility: HOSPITAL | Age: 53
End: 2018-10-11
Payer: OTHER GOVERNMENT

## 2018-10-11 VITALS
HEIGHT: 63 IN | TEMPERATURE: 98 F | BODY MASS INDEX: 29.23 KG/M2 | OXYGEN SATURATION: 98 % | SYSTOLIC BLOOD PRESSURE: 155 MMHG | RESPIRATION RATE: 14 BRPM | HEART RATE: 73 BPM | WEIGHT: 165 LBS | DIASTOLIC BLOOD PRESSURE: 81 MMHG

## 2018-10-11 DIAGNOSIS — K21.9 ACID REFLUX: ICD-10-CM

## 2018-10-11 DIAGNOSIS — K29.70 GASTRITIS, PRESENCE OF BLEEDING UNSPECIFIED, UNSPECIFIED CHRONICITY, UNSPECIFIED GASTRITIS TYPE: ICD-10-CM

## 2018-10-11 DIAGNOSIS — K44.9 HIATAL HERNIA: Primary | ICD-10-CM

## 2018-10-11 PROCEDURE — 25000003 PHARM REV CODE 250: Performed by: INTERNAL MEDICINE

## 2018-10-11 PROCEDURE — 63600175 PHARM REV CODE 636 W HCPCS: Performed by: NURSE ANESTHETIST, CERTIFIED REGISTERED

## 2018-10-11 PROCEDURE — 43239 EGD BIOPSY SINGLE/MULTIPLE: CPT | Mod: ,,, | Performed by: INTERNAL MEDICINE

## 2018-10-11 PROCEDURE — 37000008 HC ANESTHESIA 1ST 15 MINUTES: Performed by: INTERNAL MEDICINE

## 2018-10-11 PROCEDURE — 43239 EGD BIOPSY SINGLE/MULTIPLE: CPT | Performed by: INTERNAL MEDICINE

## 2018-10-11 PROCEDURE — 88342 IMHCHEM/IMCYTCHM 1ST ANTB: CPT | Mod: 26,,, | Performed by: PATHOLOGY

## 2018-10-11 PROCEDURE — 88305 TISSUE EXAM BY PATHOLOGIST: CPT | Performed by: PATHOLOGY

## 2018-10-11 PROCEDURE — 27201012 HC FORCEPS, HOT/COLD, DISP: Performed by: INTERNAL MEDICINE

## 2018-10-11 PROCEDURE — D9220A PRA ANESTHESIA: Mod: ,,, | Performed by: ANESTHESIOLOGY

## 2018-10-11 PROCEDURE — 88342 IMHCHEM/IMCYTCHM 1ST ANTB: CPT | Performed by: PATHOLOGY

## 2018-10-11 PROCEDURE — 88305 TISSUE EXAM BY PATHOLOGIST: CPT | Mod: 26,,, | Performed by: PATHOLOGY

## 2018-10-11 RX ORDER — PROPOFOL 10 MG/ML
VIAL (ML) INTRAVENOUS
Status: DISCONTINUED | OUTPATIENT
Start: 2018-10-11 | End: 2018-10-11

## 2018-10-11 RX ORDER — SODIUM CHLORIDE 9 MG/ML
INJECTION, SOLUTION INTRAVENOUS CONTINUOUS
Status: DISCONTINUED | OUTPATIENT
Start: 2018-10-11 | End: 2018-10-11 | Stop reason: HOSPADM

## 2018-10-11 RX ADMIN — PROPOFOL 120 MG: 10 INJECTION, EMULSION INTRAVENOUS at 10:10

## 2018-10-11 RX ADMIN — PROPOFOL 50 MG: 10 INJECTION, EMULSION INTRAVENOUS at 10:10

## 2018-10-11 RX ADMIN — SODIUM CHLORIDE 1000 ML: 0.9 INJECTION, SOLUTION INTRAVENOUS at 09:10

## 2018-10-11 NOTE — [employer] PATIENT INSTRUCTIONS
Discharge Summary/Instructions after an Endoscopic Procedure  Patient Name: Enma Navarro  Patient MRN: [id number]  Patient YOB: 1965 Thursday, October 11, 2018  Ellis Silver MD  RESTRICTIONS:  During your procedure today, you received medications for sedation.  These   medications may affect your judgment, balance and coordination.  Therefore,   for 24 hours, you have the following restrictions:   - DO NOT drive a car, operate machinery, make legal/financial decisions,   sign important papers or drink alcohol.    ACTIVITY:  Today: no heavy lifting, straining or running due to procedural   sedation/anesthesia.  The following day: return to full activity including work.  DIET:  Eat and drink normally unless instructed otherwise.     TREATMENT FOR COMMON SIDE EFFECTS:  - Mild abdominal pain, nausea, belching, bloating or excessive gas:  rest,   eat lightly and use a heating pad.  - Sore Throat: treat with throat lozenges and/or gargle with warm salt   water.  - Because air was used during the procedure, expelling large amounts of air   from your rectum or belching is normal.  - If a bowel prep was taken, you may not have a bowel movement for 1-3 days.    This is normal.  SYMPTOMS TO WATCH FOR AND REPORT TO YOUR PHYSICIAN:  1. Abdominal pain or bloating, other than gas cramps.  2. Chest pain.  3. Back pain.  4. Signs of infection such as: chills or fever occurring within 24 hours   after the procedure.  5. Rectal bleeding, which would show as bright red, maroon, or black stools.   (A tablespoon of blood from the rectum is not serious, especially if   hemorrhoids are present.)  6. Vomiting.  7. Weakness or dizziness.  GO DIRECTLY TO THE NEAREST EMERGENCY ROOM IF YOU HAVE ANY OF THE FOLLOWING:      Difficulty breathing              Chills and/or fever over 101 F   Persistent vomiting and/or vomiting blood   Severe abdominal pain   Severe chest pain   Black, tarry stools   Bleeding- more than one  tablespoon   Any other symptom or condition that you feel may need urgent attention  Your doctor recommends these additional instructions:  If any biopsies were taken, your doctors clinic will contact you in 1 to 2   weeks with any results.  - Patient has a contact number available for emergencies.  The signs and   symptoms of potential delayed complications were discussed with the   patient.  Return to normal activities tomorrow.  Written discharge   instructions were provided to the patient.   - Resume previous diet.   - Continue present medications.   - No aspirin, ibuprofen, naproxen, or other non-steroidal anti-inflammatory   drugs.   - Await pathology results.   - Discharge patient to home (ambulatory).   - Perform a colonoscopy as previously scheduled.   - Follow an antireflux regimen.   - Return to my office after studies are complete.  For questions, problems or results please call your physician - Ellis Silver MD at Work:  [phone].  [facility], EMERGENCY ROOM PHONE NUMBER: [phone]  IF A COMPLICATION OR EMERGENCY SITUATION ARISES AND YOU ARE UNABLE TO REACH   YOUR PHYSICIAN - GO DIRECTLY TO THE EMERGENCY ROOM.  Ellis Silver MD  10/11/2018 10:25:13 AM  This report has been verified and signed electronically.  [employer]

## 2018-10-11 NOTE — DISCHARGE INSTRUCTIONS
Gastritis (Adult)    Gastritis is inflammation and irritation of the stomach lining. It can be present for a short time (acute) or be long lasting (chronic). Gastritis is often caused by infection with bacteria called H pylori. More than a third of people in the [place] have this bacteria in their bodies. In many cases, H pylori causes no problems or symptoms. In some people, though, the infection irritates the stomach lining and causes gastritis. Other causes of stomach irritation include drinking alcohol or taking pain-relieving medicines called NSAIDs (such as aspirin or ibuprofen).   Symptoms of gastritis can include:  · Abdominal pain or bloating  · Loss of appetite  · Nausea or vomiting  · Vomiting blood or having black stools  · Feeling more tired than usual  An inflamed and irritated stomach lining is more likely to develop a sore called an ulcer. To help prevent this, gastritis should be treated.  Home care  If needed, medicines may be prescribed. If you have H pylori infection, treating it will likely relieve your symptoms. Other changes can help reduce stomach irritation and help it heal.  · If you have been prescribed medicines for H pylori infection, take them as directed. Take all of the medicine until it is finished or your healthcare provider tells you to stop, even if you feel better.  · Your healthcare provider may recommend avoiding NSAIDs. If you take daily aspirin for your heart or other medical reasons, do not stop without talking to your healthcare provider first.  · Avoid drinking alcohol.  · Stop smoking. Smoking can irritate the stomach and delay healing. As much as possible, stay away from second hand smoke.  Follow-up care  Follow up with your healthcare provider, or as advised by our staff. Testing may be needed to check for inflammation or an ulcer.  When to seek medical advice  Call your healthcare provider for any of the following:  · Stomach pain that gets worse or moves to the lower  right abdomen (appendix area)  · Chest pain that appears or gets worse, or spreads to the back, neck, shoulder, or arm  · Frequent vomiting (cant keep down liquids)  · Blood in the stool or vomit (red or black in color)  · Feeling weak or dizzy  · Fever of 100.4ºF (38ºC) or higher, or as directed by your healthcare provider  Date Last Reviewed: 6/22/2015 © 2000-2017 [employer]. [address] [place] [address]. All rights reserved. This information is not intended as a substitute for professional medical care. Always follow your healthcare professional's instructions.        What Is a Hiatal Hernia?    Hiatal hernia is when the area where the stomach and esophagus meet bulges up through the diaphragm into the chest cavity. In some cases, part of the stomach may bulge above the diaphragm. Stomach acid may move up into the esophagus and cause symptoms. The symptoms are often blamed on gastroesophageal reflux disease (GERD). You may only know about the hernia when it shows up on an X-ray taken for other reasons.   What you may feel  The hiatus is a normal hole in the diaphragm. The esophagus passes through this hole and leads to the stomach. In some cases, part of the stomach may bulge above the diaphragm. This bulge is called a hernia. Stomach acid may move up into the esophagus and cause symptoms.  When you eat, the muscle at the hiatus relaxes to allow food to pass into the stomach. It tightens again to keep food and digestive acids in the stomach.  Many people with hiatal hernias have mild symptoms. You may notice the following GERD symptoms:  · Heartburn or other chest discomfort  · A feeling of chest fullness after a meal  · Frequent burping  · Acid taste in the mouth  · Trouble swallowing  Treating symptoms  If you have been diagnosed with hiatal hernia, these suggestions may help improve symptoms:  · Lose excess weight. Extra weight puts pressure on the stomach and esophagus.  · Dont  lie down after eating. Sit up for at least an hour after eating. Lying down after eating can increase symptoms.  · Avoid certain foods and drinks. These include fatty foods, chocolate, coffee, mint, and other foods that cause symptoms for you.  · Dont smoke or drink alcohol. These can worsen symptoms.  · Look at your medicines. Discuss your medicines with your healthcare provider. Many medicines can cause symptoms.  · Consider an antacid medicine. Ask your healthcare provider about over-the-counter and prescription medicines that may help.  · Ask about surgery, if needed. Surgery is a treatment choice for some people. Your healthcare provider can determine if surgery is an option for you.    Date Last Reviewed: 10/1/2016  © 8989-4603 [employer]. [address] [place] [address], [place] [address]. All rights reserved. This information is not intended as a substitute for professional medical care. Always follow your healthcare professional's instructions.      Discharge Instructions: After Your Surgery/Procedure  Youve just had surgery. During surgery you were given medicine called anesthesia to keep you relaxed and free of pain. After surgery you may have some pain or nausea. This is common. Here are some tips for feeling better and getting well after surgery.     Stay on schedule with your medication.   Going home  Your doctor or nurse will show you how to take care of yourself when you go home. He or she will also answer your questions. Have an adult family member or friend drive you home.      For your safety we recommend these precaution for the first 24 hours after your procedure:  · Do not drive or use heavy equipment.  · Do not make important decisions or sign legal papers.  · Do not drink alcohol.  · Have someone stay with you, if needed. He or she can watch for problems and help keep you safe.  · Your concentration, balance, coordination, and judgement may be impaired for many hours after anesthesia.  " Use caution when ambulating or standing up.     · You may feel weak and "washed out" after anesthesia and surgery.      Subtle residual effects of general anesthesia or sedation with regional / local anesthesia can last more than 24 hours.  Rest for the remainder of the day or longer if your Doctor/Surgeon has advised you to do so.  Although you may feel normal within the first 24 hours, your reflexes and mental ability may be impaired without you realizing it.  You may feel dizzy, lightheaded or sleepy for 24 hours or longer.      Be sure to go to all follow-up visits with your doctor. And rest after your surgery for as long as your doctor tells you to.  Coping with pain  If you have pain after surgery, pain medicine will help you feel better. Take it as told, before pain becomes severe. Also, ask your doctor or pharmacist about other ways to control pain. This might be with heat, ice, or relaxation. And follow any other instructions your surgeon or nurse gives you.  Tips for taking pain medicine  To get the best relief possible, remember these points:  · Pain medicines can upset your stomach. Taking them with a little food may help.  · Most pain relievers taken by mouth need at least 20 to 30 minutes to start to work.  · Taking medicine on a schedule can help you remember to take it. Try to time your medicine so that you can take it before starting an activity. This might be before you get dressed, go for a walk, or sit down for dinner.  · Constipation is a common side effect of pain medicines. Call your doctor before taking any medicines such as laxatives or stool softeners to help ease constipation. Also ask if you should skip any foods. Drinking lots of fluids and eating foods such as fruits and vegetables that are high in fiber can also help. Remember, do not take laxatives unless your surgeon has prescribed them.  · Drinking alcohol and taking pain medicine can cause dizziness and slow your breathing. It can " even be deadly. Do not drink alcohol while taking pain medicine.  · Pain medicine can make you react more slowly to things. Do not drive or run machinery while taking pain medicine.  Your health care provider may tell you to take acetaminophen to help ease your pain. Ask him or her how much you are supposed to take each day. Acetaminophen or other pain relievers may interact with your prescription medicines or other over-the-counter (OTC) drugs. Some prescription medicines have acetaminophen and other ingredients. Using both prescription and OTC acetaminophen for pain can cause you to overdose. Read the labels on your OTC medicines with care. This will help you to clearly know the list of ingredients, how much to take, and any warnings. It may also help you not take too much acetaminophen. If you have questions or do not understand the information, ask your pharmacist or health care provider to explain it to you before you take the OTC medicine.  Managing nausea  Some people have an upset stomach after surgery. This is often because of anesthesia, pain, or pain medicine, or the stress of surgery. These tips will help you handle nausea and eat healthy foods as you get better. If you were on a special food plan before surgery, ask your doctor if you should follow it while you get better. These tips may help:  · Do not push yourself to eat. Your body will tell you when to eat and how much.  · Start off with clear liquids and soup. They are easier to digest.  · Next try semi-solid foods, such as mashed potatoes, applesauce, and gelatin, as you feel ready.  · Slowly move to solid foods. Dont eat fatty, rich, or spicy foods at first.  · Do not force yourself to have 3 large meals a day. Instead eat smaller amounts more often.  · Take pain medicines with a small amount of solid food, such as crackers or toast, to avoid nausea.     Call your surgeon if  · You still have pain an hour after taking medicine. The medicine may  not be strong enough.  · You feel too sleepy, dizzy, or groggy. The medicine may be too strong.  · You have side effects like nausea, vomiting, or skin changes, such as rash, itching, or hives.       If you have obstructive sleep apnea  You were given anesthesia medicine during surgery to keep you comfortable and free of pain. After surgery, you may have more apnea spells because of this medicine and other medicines you were given. The spells may last longer than usual.   At home:  · Keep using the continuous positive airway pressure (CPAP) device when you sleep. Unless your health care provider tells you not to, use it when you sleep, day or night. CPAP is a common device used to treat obstructive sleep apnea.  · Talk with your provider before taking any pain medicine, muscle relaxants, or sedatives. Your provider will tell you about the possible dangers of taking these medicines.  © 3950-2262 [employer]. [address] [place] [address], [place] [address]. All rights reserved. This information is not intended as a substitute for professional medical care. Always follow your healthcare professional's instructions.

## 2018-10-11 NOTE — ANESTHESIA POSTPROCEDURE EVALUATION
"Anesthesia Post Evaluation    Patient: Enma Navarro    Procedure(s) Performed: Procedure(s) (LRB):  EGD (ESOPHAGOGASTRODUODENOSCOPY) (N/A)    Final Anesthesia Type: general  Patient location during evaluation: PACU  Patient participation: Yes- Able to Participate  Level of consciousness: awake and alert  Post-procedure vital signs: reviewed and stable  Pain management: adequate  Airway patency: patent  PONV status at discharge: No PONV  Anesthetic complications: no      Cardiovascular status: hemodynamically stable  Respiratory status: unassisted and room air  Hydration status: euvolemic  Follow-up not needed.        Visit Vitals  BP (!) 155/81   Pulse 73   Temp 36.7 °C (98 °F)   Resp 14   Ht 5' 3" (1.6 m)   Wt 74.8 kg (165 lb)   SpO2 98%   Breastfeeding? Unknown   BMI 29.23 kg/m²       Pain/Daniel Score: Pain Assessment Performed: Yes (10/11/2018 10:25 AM)  Presence of Pain: denies (10/11/2018 10:37 AM)  Daniel Score: 10 (10/11/2018 10:55 AM)        "

## 2018-10-11 NOTE — TRANSFER OF CARE
"Anesthesia Transfer of Care Note    Patient: Enma Navarro    Procedure(s) Performed: Procedure(s) (LRB):  EGD (ESOPHAGOGASTRODUODENOSCOPY) (N/A)    Patient location: GI    Anesthesia Type: general    Transport from OR: Transported from OR on 2-3 L/min O2 by NC with adequate spontaneous ventilation    Post pain: adequate analgesia    Post assessment: no apparent anesthetic complications    Post vital signs: stable    Level of consciousness: awake    Nausea/Vomiting: no nausea/vomiting    Complications: none    Transfer of care protocol was followed      Last vitals:   Visit Vitals  BP (!) 151/77 (BP Location: Left arm, Patient Position: Lying)   Pulse 82   Temp 36.7 °C (98.1 °F) (Oral)   Resp 16   Ht 5' 3" (1.6 m)   Wt 74.8 kg (165 lb)   SpO2 100%   Breastfeeding? Unknown   BMI 29.23 kg/m²     "

## 2018-10-11 NOTE — ANESTHESIA PREPROCEDURE EVALUATION
10/11/2018  Enma Navarro is a 53 y.o., female.    Anesthesia Evaluation    I have reviewed the Patient Summary Reports.    I have reviewed the Nursing Notes.   I have reviewed the Medications.     Review of Systems  Anesthesia Hx:  No problems with previous Anesthesia    Hematology/Oncology:         -- Cancer in past history: Breast bilateral   EENT/Dental:EENT/Dental Normal   Cardiovascular:  Cardiovascular Normal     Pulmonary:  Pulmonary Normal    Hepatic/GI:   GERD    Musculoskeletal:  Musculoskeletal Normal    Neurological:  Neurology Normal    Endocrine:  Endocrine Normal    Dermatological:  Skin Normal    Psych:  Psychiatric Normal           Physical Exam  General:  Well nourished    Airway/Jaw/Neck:  Airway Findings: Mouth Opening: Normal Tongue: Normal  General Airway Assessment: Adult  Mallampati: I  TM Distance: Normal, at least 6 cm        Eyes/Ears/Nose:  EYES/EARS/NOSE FINDINGS: Normal   Dental:  DENTAL FINDINGS: Normal   Chest/Lungs:  Chest/Lungs Findings: Clear to auscultation, Normal Respiratory Rate     Heart/Vascular:  Heart Findings: Rate: Normal  Rhythm: Regular Rhythm        Mental Status:  Mental Status Findings:  Cooperative, Alert and Oriented         Anesthesia Plan  Type of Anesthesia, risks & benefits discussed:  Anesthesia Type:  general  Patient's Preference:   Intra-op Monitoring Plan: standard ASA monitors  Intra-op Monitoring Plan Comments:   Post Op Pain Control Plan:   Post Op Pain Control Plan Comments:   Induction:   IV  Beta Blocker:  Patient is not currently on a Beta-Blocker (No further documentation required).       Informed Consent: Patient understands risks and agrees with Anesthesia plan.  Questions answered. Anesthesia consent signed with patient.  ASA Score: 2     Day of Surgery Review of History & Physical:    H&P update referred to the provider.          Ready For Surgery From Anesthesia Perspective.

## 2018-11-16 ENCOUNTER — HOSPITAL ENCOUNTER (OUTPATIENT)
Facility: HOSPITAL | Age: 53
Discharge: HOME | End: 2018-11-16
Attending: INTERNAL MEDICINE | Admitting: INTERNAL MEDICINE
Payer: OTHER GOVERNMENT

## 2018-11-16 ENCOUNTER — ANESTHESIA EVENT (OUTPATIENT)
Dept: ENDOSCOPY | Facility: HOSPITAL | Age: 53
End: 2018-11-16
Payer: OTHER GOVERNMENT

## 2018-11-16 ENCOUNTER — ANESTHESIA (OUTPATIENT)
Dept: ENDOSCOPY | Facility: HOSPITAL | Age: 53
End: 2018-11-16
Payer: OTHER GOVERNMENT

## 2018-11-16 DIAGNOSIS — K63.5 POLYP OF COLON, UNSPECIFIED PART OF COLON, UNSPECIFIED TYPE: Primary | ICD-10-CM

## 2018-11-16 DIAGNOSIS — K64.8 INTERNAL HEMORRHOIDS: ICD-10-CM

## 2018-11-16 DIAGNOSIS — Z12.11 SCREEN FOR COLON CANCER: ICD-10-CM

## 2018-11-16 PROCEDURE — 45385 COLONOSCOPY W/LESION REMOVAL: CPT | Performed by: INTERNAL MEDICINE

## 2018-11-16 PROCEDURE — 37000009 HC ANESTHESIA EA ADD 15 MINS: Performed by: INTERNAL MEDICINE

## 2018-11-16 PROCEDURE — 37000008 HC ANESTHESIA 1ST 15 MINUTES: Performed by: INTERNAL MEDICINE

## 2018-11-16 PROCEDURE — 45385 COLONOSCOPY W/LESION REMOVAL: CPT | Mod: 33,,, | Performed by: INTERNAL MEDICINE

## 2018-11-16 PROCEDURE — 27201089 HC SNARE, DISP (ANY): Performed by: INTERNAL MEDICINE

## 2018-11-16 PROCEDURE — 88305 TISSUE EXAM BY PATHOLOGIST: CPT | Mod: 26,,, | Performed by: PATHOLOGY

## 2018-11-16 PROCEDURE — 25000003 PHARM REV CODE 250: Performed by: INTERNAL MEDICINE

## 2018-11-16 PROCEDURE — D9220A PRA ANESTHESIA: Mod: 33,,, | Performed by: ANESTHESIOLOGY

## 2018-11-16 PROCEDURE — 63600175 PHARM REV CODE 636 W HCPCS: Performed by: NURSE ANESTHETIST, CERTIFIED REGISTERED

## 2018-11-16 PROCEDURE — 88305 TISSUE EXAM BY PATHOLOGIST: CPT | Performed by: PATHOLOGY

## 2018-11-16 RX ORDER — CALCIUM CARBONATE 200(500)MG
1 TABLET,CHEWABLE ORAL DAILY
COMMUNITY

## 2018-11-16 RX ORDER — LIDOCAINE HYDROCHLORIDE 20 MG/ML
INJECTION, SOLUTION EPIDURAL; INFILTRATION; INTRACAUDAL; PERINEURAL
Status: DISCONTINUED
Start: 2018-11-16 | End: 2018-11-16 | Stop reason: HOSPADM

## 2018-11-16 RX ORDER — SODIUM CHLORIDE 0.9 % (FLUSH) 0.9 %
3 SYRINGE (ML) INJECTION
Status: CANCELLED | OUTPATIENT
Start: 2018-11-16

## 2018-11-16 RX ORDER — LIDOCAINE HCL/PF 100 MG/5ML
SYRINGE (ML) INTRAVENOUS
Status: DISCONTINUED | OUTPATIENT
Start: 2018-11-16 | End: 2018-11-16

## 2018-11-16 RX ORDER — SODIUM CHLORIDE 9 MG/ML
INJECTION, SOLUTION INTRAVENOUS CONTINUOUS
Status: DISCONTINUED | OUTPATIENT
Start: 2018-11-16 | End: 2018-11-16 | Stop reason: HOSPADM

## 2018-11-16 RX ORDER — PROPOFOL 10 MG/ML
INJECTION, EMULSION INTRAVENOUS
Status: DISCONTINUED
Start: 2018-11-16 | End: 2018-11-16 | Stop reason: HOSPADM

## 2018-11-16 RX ORDER — PROPOFOL 10 MG/ML
VIAL (ML) INTRAVENOUS
Status: DISCONTINUED | OUTPATIENT
Start: 2018-11-16 | End: 2018-11-16

## 2018-11-16 RX ADMIN — LIDOCAINE HYDROCHLORIDE 100 MG: 20 INJECTION, SOLUTION INTRAVENOUS at 01:11

## 2018-11-16 RX ADMIN — PROPOFOL 100 MG: 10 INJECTION, EMULSION INTRAVENOUS at 01:11

## 2018-11-16 RX ADMIN — SODIUM CHLORIDE 1000 ML: 0.9 INJECTION, SOLUTION INTRAVENOUS at 12:11

## 2018-11-16 NOTE — OR NURSING
Have you had a colonoscopy LESS THAN 3 years ago? NO  NEVER  * If YES, answer these questions*:    1. Did patient have a prior colonic polyp in a previous surveillance/diagnostic colonoscopy and is 18 years or older on date of encounter?    2. Documentation of < 3 year interval since the patients last colonoscopy due to medical reasons (eg., last colonoscopy incomplete, last colonoscopy had inadequate prep, piecemeal removal of adenomas, or last colonoscopy found > 10 adenomas) ?

## 2018-11-16 NOTE — H&P
CC: Screening for colorectal cancer - first occurrence    53 year old female with above. States that symptoms are absent, no alleviating/exacerbating factors. No family history of CA. No personal history of polyps. No bleeding or weight loss.     ROS:  No headache, no fever/chills, no chest pain/SOB, no nausea/vomiting/diarrhea/constipation/GI bleeding/abdominal pain, no dysuria/hematuria.    VSSAF   Exam:   Alert and oriented x 3; no apparent distress   PERRLA, sclera anicteric  CV: Regular rate/rhythm, normal PMI   Lungs: Clear bilaterally with no wheeze/rales   Abdomen: Soft, NT/ND, normal bowel sounds   Ext: No cyanosis, clubbing     Impression:   As above    Plan:   Proceed with endoscopy. Further recs to follow.

## 2018-11-16 NOTE — [employer] PATIENT INSTRUCTIONS
Discharge Summary/Instructions after an Endoscopic Procedure  Patient Name: Enma Navarro  Patient MRN: [id number]  Patient YOB: 1965 Friday, November 16, 2018  Ellis Silver MD  RESTRICTIONS:  During your procedure today, you received medications for sedation.  These   medications may affect your judgment, balance and coordination.  Therefore,   for 24 hours, you have the following restrictions:   - DO NOT drive a car, operate machinery, make legal/financial decisions,   sign important papers or drink alcohol.    ACTIVITY:  Today: no heavy lifting, straining or running due to procedural   sedation/anesthesia.  The following day: return to full activity including work.  DIET:  Eat and drink normally unless instructed otherwise.     TREATMENT FOR COMMON SIDE EFFECTS:  - Mild abdominal pain, nausea, belching, bloating or excessive gas:  rest,   eat lightly and use a heating pad.  - Sore Throat: treat with throat lozenges and/or gargle with warm salt   water.  - Because air was used during the procedure, expelling large amounts of air   from your rectum or belching is normal.  - If a bowel prep was taken, you may not have a bowel movement for 1-3 days.    This is normal.  SYMPTOMS TO WATCH FOR AND REPORT TO YOUR PHYSICIAN:  1. Abdominal pain or bloating, other than gas cramps.  2. Chest pain.  3. Back pain.  4. Signs of infection such as: chills or fever occurring within 24 hours   after the procedure.  5. Rectal bleeding, which would show as bright red, maroon, or black stools.   (A tablespoon of blood from the rectum is not serious, especially if   hemorrhoids are present.)  6. Vomiting.  7. Weakness or dizziness.  GO DIRECTLY TO THE NEAREST EMERGENCY ROOM IF YOU HAVE ANY OF THE FOLLOWING:      Difficulty breathing              Chills and/or fever over 101 F   Persistent vomiting and/or vomiting blood   Severe abdominal pain   Severe chest pain   Black, tarry stools   Bleeding- more than one  tablespoon   Any other symptom or condition that you feel may need urgent attention  Your doctor recommends these additional instructions:  If any biopsies were taken, your doctors clinic will contact you in 1 to 2   weeks with any results.  - Patient has a contact number available for emergencies.  The signs and   symptoms of potential delayed complications were discussed with the   patient.  Return to normal activities tomorrow.  Written discharge   instructions were provided to the patient.   - High fiber diet.   - Continue present medications.   - Await pathology results.   - Repeat colonoscopy in 5 years for surveillance.   - Discharge patient to home (ambulatory).   - Return to my office PRN.  For questions, problems or results please call your physician - Ellis Silver MD at Work:  [phone].  [facility], EMERGENCY ROOM PHONE NUMBER: [phone]  IF A COMPLICATION OR EMERGENCY SITUATION ARISES AND YOU ARE UNABLE TO REACH   YOUR PHYSICIAN - GO DIRECTLY TO THE EMERGENCY ROOM.  Ellis Silver MD  11/16/2018 2:04:42 PM  This report has been verified and signed electronically.  [employer]

## 2018-11-16 NOTE — TRANSFER OF CARE
"Anesthesia Transfer of Care Note    Patient: Enma Navarro    Procedure(s) Performed: Procedure(s) (LRB):  COLONOSCOPY (N/A)    Patient location: GI    Anesthesia Type: general    Transport from OR: Transported from OR on room air with adequate spontaneous ventilation    Post pain: adequate analgesia    Post assessment: no apparent anesthetic complications and tolerated procedure well    Post vital signs: stable    Level of consciousness: awake, alert and oriented    Nausea/Vomiting: no nausea/vomiting    Complications: none    Transfer of care protocol was followed      Last vitals:   Visit Vitals  BP (!) 146/89   Pulse 100   Temp 36.8 °C (98.2 °F) (Temporal)   Resp 20   Ht 5' 3" (1.6 m)   Wt 72.1 kg (159 lb)   SpO2 99%   Breastfeeding? No   BMI 28.17 kg/m²     "

## 2018-11-16 NOTE — ANESTHESIA POSTPROCEDURE EVALUATION
"Anesthesia Post Evaluation    Patient: Enma Navarro    Procedure(s) Performed: Procedure(s) (LRB):  COLONOSCOPY (N/A)    Final Anesthesia Type: general  Patient location during evaluation: PACU  Patient participation: Yes- Able to Participate  Level of consciousness: awake and alert, oriented and awake  Post-procedure vital signs: reviewed and stable  Pain management: adequate  Airway patency: patent  PONV status at discharge: No PONV  Anesthetic complications: no      Cardiovascular status: blood pressure returned to baseline and hemodynamically stable  Respiratory status: unassisted, spontaneous ventilation and room air  Hydration status: euvolemic  Follow-up not needed.        Visit Vitals  BP (!) 163/85   Pulse 85   Temp 36.7 °C (98.1 °F) (Temporal)   Resp 20   Ht 5' 3" (1.6 m)   Wt 72.1 kg (159 lb)   SpO2 98%   Breastfeeding? No   BMI 28.17 kg/m²       Pain/Daniel Score: Pain Assessment Performed: Yes (11/16/2018 12:31 PM)  Presence of Pain: denies (11/16/2018  2:31 PM)  Daniel Score: 10 (11/16/2018  2:31 PM)        "

## 2018-11-16 NOTE — DISCHARGE INSTRUCTIONS
"Discharge Instructions: After Your Surgery/Procedure  Youve just had surgery. During surgery you were given medicine called anesthesia to keep you relaxed and free of pain. After surgery you may have some pain or nausea. This is common. Here are some tips for feeling better and getting well after surgery.     Stay on schedule with your medication.   Going home  Your doctor or nurse will show you how to take care of yourself when you go home. He or she will also answer your questions. Have an adult family member or friend drive you home.      For your safety we recommend these precaution for the first 24 hours after your procedure:  · Do not drive or use heavy equipment.  · Do not make important decisions or sign legal papers.  · Do not drink alcohol.  · Have someone stay with you, if needed. He or she can watch for problems and help keep you safe.  · Your concentration, balance, coordination, and judgement may be impaired for many hours after anesthesia.  Use caution when ambulating or standing up.     · You may feel weak and "washed out" after anesthesia and surgery.      Subtle residual effects of general anesthesia or sedation with regional / local anesthesia can last more than 24 hours.  Rest for the remainder of the day or longer if your Doctor/Surgeon has advised you to do so.  Although you may feel normal within the first 24 hours, your reflexes and mental ability may be impaired without you realizing it.  You may feel dizzy, lightheaded or sleepy for 24 hours or longer.      Be sure to go to all follow-up visits with your doctor. And rest after your surgery for as long as your doctor tells you to.  Coping with pain  If you have pain after surgery, pain medicine will help you feel better. Take it as told, before pain becomes severe. Also, ask your doctor or pharmacist about other ways to control pain. This might be with heat, ice, or relaxation. And follow any other instructions your surgeon or nurse gives " you.  Tips for taking pain medicine  To get the best relief possible, remember these points:  · Pain medicines can upset your stomach. Taking them with a little food may help.  · Most pain relievers taken by mouth need at least 20 to 30 minutes to start to work.  · Taking medicine on a schedule can help you remember to take it. Try to time your medicine so that you can take it before starting an activity. This might be before you get dressed, go for a walk, or sit down for dinner.  · Constipation is a common side effect of pain medicines. Call your doctor before taking any medicines such as laxatives or stool softeners to help ease constipation. Also ask if you should skip any foods. Drinking lots of fluids and eating foods such as fruits and vegetables that are high in fiber can also help. Remember, do not take laxatives unless your surgeon has prescribed them.  · Drinking alcohol and taking pain medicine can cause dizziness and slow your breathing. It can even be deadly. Do not drink alcohol while taking pain medicine.  · Pain medicine can make you react more slowly to things. Do not drive or run machinery while taking pain medicine.  Your health care provider may tell you to take acetaminophen to help ease your pain. Ask him or her how much you are supposed to take each day. Acetaminophen or other pain relievers may interact with your prescription medicines or other over-the-counter (OTC) drugs. Some prescription medicines have acetaminophen and other ingredients. Using both prescription and OTC acetaminophen for pain can cause you to overdose. Read the labels on your OTC medicines with care. This will help you to clearly know the list of ingredients, how much to take, and any warnings. It may also help you not take too much acetaminophen. If you have questions or do not understand the information, ask your pharmacist or health care provider to explain it to you before you take the OTC medicine.  Managing  nausea  Some people have an upset stomach after surgery. This is often because of anesthesia, pain, or pain medicine, or the stress of surgery. These tips will help you handle nausea and eat healthy foods as you get better. If you were on a special food plan before surgery, ask your doctor if you should follow it while you get better. These tips may help:  · Do not push yourself to eat. Your body will tell you when to eat and how much.  · Start off with clear liquids and soup. They are easier to digest.  · Next try semi-solid foods, such as mashed potatoes, applesauce, and gelatin, as you feel ready.  · Slowly move to solid foods. Dont eat fatty, rich, or spicy foods at first.  · Do not force yourself to have 3 large meals a day. Instead eat smaller amounts more often.  · Take pain medicines with a small amount of solid food, such as crackers or toast, to avoid nausea.     Call your surgeon if  · You still have pain an hour after taking medicine. The medicine may not be strong enough.  · You feel too sleepy, dizzy, or groggy. The medicine may be too strong.  · You have side effects like nausea, vomiting, or skin changes, such as rash, itching, or hives.       If you have obstructive sleep apnea  You were given anesthesia medicine during surgery to keep you comfortable and free of pain. After surgery, you may have more apnea spells because of this medicine and other medicines you were given. The spells may last longer than usual.   At home:  · Keep using the continuous positive airway pressure (CPAP) device when you sleep. Unless your health care provider tells you not to, use it when you sleep, day or night. CPAP is a common device used to treat obstructive sleep apnea.  · Talk with your provider before taking any pain medicine, muscle relaxants, or sedatives. Your provider will tell you about the possible dangers of taking these medicines.  © 8885-4358 [employer]. [address] [place] [address]  [place] [address]. All rights reserved. This information is not intended as a substitute for professional medical care. Always follow your healthcare professional's instructions.    4 Steps for Eating Healthier  Changing the way you eat can improve your health. It can lower your cholesterol and blood pressure, and help you stay at a healthy weight. Your diet doesnt have to be bland and boring to be healthy. Just watch your calories and follow these steps:    1. Eat fewer unhealthy fats  · Choose more fish and lean meats instead of fatty cuts of meat.  · Skip butter and lard, and use less margarine.  · Pass on foods that have palm, coconut, or hydrogenated oils.  · Eat fewer high-fat dairy foods like cheese, ice cream, and whole milk.  · Get a heart-healthy cookbook and try some low-fat recipes.  2. Go light on salt  · Keep the saltshaker off the table.  · Limit high-salt ingredients, such as soy sauce, bouillon, and garlic salt.  · Instead of adding salt when cooking, season your food with herbs and flavorings. Try lemon, garlic, and onion.  · Limit convenience foods, such as boxed or canned foods and restaurant food.  · Read food labels and choose lower-sodium options.  3. Limit sugar  · Pause before you add sugars to pancakes, cereal, coffee, or tea. This includes white and brown table sugar, syrup, honey, and molasses. Cut your usual amount by half.  · Use non-sugar sweeteners. Stevia, aspartame, and sucralose can satisfy a sweet tooth without adding calories.  · Swap out sugar-filled soda and other drinks. Buy sugar-free or low-calorie beverages. Remember water is always the best choice.  · Read labels and choose foods with less added sugar. Keep in mind that dairy foods and foods with fruit will have some natural sugar.  · Cut the sugar in recipes by 1/3 to 1/2. Boost the flavor with extracts like almond, vanilla, or orange. Or add spices such as cinnamon or nutmeg.  4. Eat more fiber  · Eat fresh fruits and vegetables  every day.  · Boost your diet with whole grains. Go for oats, whole-grain rice, and bran.  · Add beans and lentils to your meals.  · Drink more water to match your fiber increase. This is to help prevent constipation.  Date Last Reviewed: 5/11/2015  © 7614-1868 [employer]. [address] [place] [address], [place] [address]. All rights reserved. This information is not intended as a substitute for professional medical care. Always follow your healthcare professional's instructions.        Understanding Colon and Rectal Polyps    The colon (also called the large intestine) is a muscular tube that forms the last part of the digestive tract. It absorbs water and stores food waste. The colon is about 4 to 6 feet long. The rectum is the last 6 inches of the colon. The colon and rectum have a smooth lining composed of millions of cells. Changes in these cells can lead to growths in the colon that can become cancerous and should be removed. Multiple tests are available to screen for colon cancer, but the colonoscopy is the most recommended test. During colonoscopy, these polyps can be removed. How often you need this test depends on many things including your condition, your family history, symptoms, and what the findings were at the previous colonoscopy.   When the colon lining changes  Changes that happen in the cells that line the colon or rectum can lead to growths called polyps. Over a period of years, polyps can turn cancerous. Removing polyps early may prevent cancer from ever forming.  Polyps  Polyps are fleshy clumps of tissue that form on the lining of the colon or rectum. Small polyps are usually benign (not cancerous). However, over time, cells in a polyp can change and become cancerous. Certain types of polyps known as adenomatous polyps are premalignant. The risk for invasive cancer increases with the size of the polyp and certain cell and gene features. This means that they can become cancerous if  they're not removed. Hyperplastic polyps are benign. They can grow quite large and not turn cancerous.   Cancer  Almost all colorectal cancers start when polyp cells begin growing abnormally. As a cancerous tumor grows, it may involve more and more of the colon or rectum. In time, cancer can also grow beyond the colon or rectum and spread to nearby organs or to glands called lymph nodes. The cells can also travel to other parts of the body. This is known as metastasis. The earlier a cancerous tumor is removed, the better the chance of preventing its spread.    Date Last Reviewed: 8/1/2016  © 7964-1190 [employer]. [address] [place] [address], [place] [address]. All rights reserved. This information is not intended as a substitute for professional medical care. Always follow your healthcare professional's instructions.

## 2018-11-16 NOTE — ANESTHESIA PREPROCEDURE EVALUATION
11/16/2018  Enma Navarro is a 53 y.o., female.    Pre-op Assessment    I have reviewed the Patient Summary Reports.     I have reviewed the Nursing Notes.   I have reviewed the Medications.     Review of Systems  Anesthesia Hx:  No problems with previous Anesthesia    Hematology/Oncology:         -- Cancer in past history: Breast bilateral   EENT/Dental:EENT/Dental Normal   Cardiovascular:  Cardiovascular Normal     Pulmonary:  Pulmonary Normal    Hepatic/GI:   GERD    Musculoskeletal:  Musculoskeletal Normal    Neurological:  Neurology Normal    Endocrine:  Endocrine Normal    Dermatological:  Skin Normal    Psych:  Psychiatric Normal           Physical Exam  General:  Well nourished    Airway/Jaw/Neck:  Airway Findings: Mouth Opening: Normal Tongue: Normal  General Airway Assessment: Adult  Mallampati: I  TM Distance: Normal, at least 6 cm        Eyes/Ears/Nose:  EYES/EARS/NOSE FINDINGS: Normal   Dental:  DENTAL FINDINGS: Normal   Chest/Lungs:  Chest/Lungs Findings: Clear to auscultation, Normal Respiratory Rate     Heart/Vascular:  Heart Findings: Rate: Normal  Rhythm: Regular Rhythm        Mental Status:  Mental Status Findings:  Cooperative, Alert and Oriented         Anesthesia Plan  Type of Anesthesia, risks & benefits discussed:  Anesthesia Type:  general  Patient's Preference:   Intra-op Monitoring Plan: standard ASA monitors  Intra-op Monitoring Plan Comments:   Post Op Pain Control Plan:   Post Op Pain Control Plan Comments:   Induction:   IV  Beta Blocker:  Patient is not currently on a Beta-Blocker (No further documentation required).       Informed Consent: Patient understands risks and agrees with Anesthesia plan.  Questions answered. Anesthesia consent signed with patient.  ASA Score: 2     Day of Surgery Review of History & Physical:    H&P update referred to the provider.         Ready  For Surgery From Anesthesia Perspective.

## 2018-11-19 VITALS
DIASTOLIC BLOOD PRESSURE: 85 MMHG | OXYGEN SATURATION: 98 % | SYSTOLIC BLOOD PRESSURE: 163 MMHG | HEIGHT: 63 IN | WEIGHT: 159 LBS | TEMPERATURE: 98 F | RESPIRATION RATE: 20 BRPM | HEART RATE: 85 BPM | BODY MASS INDEX: 28.17 KG/M2

## 2018-11-27 ENCOUNTER — TELEPHONE (OUTPATIENT)
Dept: GASTROENTEROLOGY | Facility: CLINIC | Age: 53
End: 2018-11-27

## 2018-11-27 NOTE — TELEPHONE ENCOUNTER
----- Message from Estiven Gale sent at 11/27/2018  4:10 PM CST -----  Contact: Patient  Type:  Test Results    Who Called:  Patient  Name of Test (Lab/Mammo/Etc):  colonscopy  Date of Test:  11/16/18  Ordering Provider:  Dr. Ellis Gutierres  Where the test was performed:  [facility]  Best Call Back Number:  [phone]  Additional Information:  NA

## 2019-02-05 ENCOUNTER — OFFICE VISIT (OUTPATIENT)
Dept: HEMATOLOGY/ONCOLOGY | Facility: CLINIC | Age: 54
End: 2019-02-05
Payer: OTHER GOVERNMENT

## 2019-02-05 VITALS
SYSTOLIC BLOOD PRESSURE: 118 MMHG | TEMPERATURE: 99 F | DIASTOLIC BLOOD PRESSURE: 72 MMHG | WEIGHT: 162.63 LBS | BODY MASS INDEX: 28.8 KG/M2 | RESPIRATION RATE: 20 BRPM | HEART RATE: 71 BPM

## 2019-02-05 DIAGNOSIS — Z85.3 HISTORY OF BREAST CANCER: Chronic | ICD-10-CM

## 2019-02-05 DIAGNOSIS — M81.0 OSTEOPOROSIS, UNSPECIFIED OSTEOPOROSIS TYPE, UNSPECIFIED PATHOLOGICAL FRACTURE PRESENCE: ICD-10-CM

## 2019-02-05 PROCEDURE — 99213 OFFICE O/P EST LOW 20 MIN: CPT | Mod: ,,, | Performed by: INTERNAL MEDICINE

## 2019-02-05 PROCEDURE — 99213 PR OFFICE/OUTPT VISIT, EST, LEVL III, 20-29 MIN: ICD-10-PCS | Mod: ,,, | Performed by: INTERNAL MEDICINE

## 2019-02-05 NOTE — LETTER
February 7, 2019      Rosa Edgar MD  [address]  [address]  [place] [address]           [facility]  [address]  [address]  [place] [address]  Phone: [phone]  Fax: [phone]          Patient: Enma Navarro   MR Number: [id number]   YOB: 1965   Date of Visit: 2/5/2019       Dear Dr. Rosa Edgar:    Thank you for referring Enma Navarro to me for evaluation. Attached you will find relevant portions of my assessment and plan of care.    If you have questions, please do not hesitate to call me. I look forward to following Enma Navarro along with you.    Sincerely,    Delbert Edgar MD    Enclosure  CC:  No Recipients    If you would like to receive this communication electronically, please contact [email or web address] or [phone] to request more information on [employer] Link access.    For providers and/or their staff who would like to refer a patient to [facility], please contact us through our one-stop-shop provider referral line, [facility], at [phone].    If you feel you have received this communication in error or would no longer like to receive these types of communications, please e-mail [email or web address]

## 2019-02-05 NOTE — ASSESSMENT & PLAN NOTE
Patient is doing well and remains FREDERICK at this time.  Exam is negative and she is doing well overall.  Will continue yearly visits.

## 2019-02-05 NOTE — ASSESSMENT & PLAN NOTE
Patient remains on calcium supplementation at this time.  No fractures and she seems to be doing well.  Has not started nasal spray.  Will reach out to Dr. LORETA Edgar on this issue.

## 2019-02-05 NOTE — PROGRESS NOTES
PROGRESS NOTE    Subjective:       Patient ID: Enma Navarro is a 53 y.o. female.    Chief Complaint:  Follow-up  breast cancer follow up.     History of Present Illness:   Enma Navarro is a 53 y.o. female who presents for routine follow up of breast cancer. Has a small lump at umbilicus.     Ultrasound and CT were negative. No changes. No new complaints.   Family and Social history reviewed and is unchanged from 7/29/2014.       ROS:  Review of Systems   Constitutional: Negative for fever.   Respiratory: Negative for shortness of breath.    Cardiovascular: Negative for chest pain and leg swelling.   Gastrointestinal: Negative for abdominal pain and blood in stool.   Genitourinary: Negative for hematuria.   Skin: Negative for rash.          Current Outpatient Medications:     calcitonin, salmon, (FORTICAL) 200 unit/actuation nasal spray, 1 spray by Nasal route once daily., Disp: 1 Bottle, Rfl: 11    calcium carbonate (TUMS) 200 mg calcium (500 mg) chewable tablet, Take 1 tablet by mouth once daily., Disp: , Rfl:     calcium-vitamin D3 (CALCIUM 500 + D) 500 mg(1,250mg) -200 unit per tablet, Take 1 tablet by mouth 2 (two) times daily with meals., Disp: , Rfl:     ergocalciferol, vitamin D2, (VITAMIN D ORAL), Take by mouth., Disp: , Rfl:     multivitamin (ONE DAILY MULTIVITAMIN) per tablet, Take 1 tablet by mouth once daily., Disp: , Rfl:     omeprazole (PRILOSEC) 20 MG capsule, Take 1 capsule (20 mg total) by mouth once daily., Disp: 30 capsule, Rfl: 5        Objective:       Physical Examination:     /72   Pulse 71   Temp 98.7 °F (37.1 °C)   Resp 20   Wt 73.8 kg (162 lb 9.6 oz)   BMI 28.80 kg/m²     Physical Exam   Constitutional: She appears well-developed and well-nourished.   HENT:   Head: Normocephalic and atraumatic.   Right Ear: External ear normal.   Left Ear: External ear normal.   Mouth/Throat: Oropharynx is clear and moist.    Eyes: Conjunctivae are normal. Pupils are equal, round, and reactive to light.   Neck: No tracheal deviation present. No thyromegaly present.   Cardiovascular: Normal rate, regular rhythm and normal heart sounds.   Pulmonary/Chest: Effort normal and breath sounds normal.       Abdominal: Soft. Bowel sounds are normal. She exhibits no distension and no mass. There is no tenderness.   Musculoskeletal: She exhibits no edema.   Neurological:   Neuro intact througout   Skin: No rash noted.   Psychiatric: She has a normal mood and affect. Her behavior is normal. Judgment and thought content normal.       Labs:   No results found for this or any previous visit (from the past 336 hour(s)).  CMP  Sodium   Date Value Ref Range Status   08/27/2018 142 135 - 146 mmol/L Final     Potassium   Date Value Ref Range Status   08/27/2018 4.4 3.5 - 5.3 mmol/L Final     Chloride   Date Value Ref Range Status   08/27/2018 107 98 - 110 mmol/L Final     CO2   Date Value Ref Range Status   08/27/2018 25 20 - 32 mmol/L Final     Glucose   Date Value Ref Range Status   08/27/2018 92 65 - 99 mg/dL Final     Comment:                   Fasting reference interval          BUN, Bld   Date Value Ref Range Status   08/27/2018 15 7 - 25 mg/dL Final     Creatinine   Date Value Ref Range Status   08/27/2018 0.69 0.50 - 1.05 mg/dL Final     Comment:     For patients >49 years of age, the reference limit  for Creatinine is approximately 13% higher for people  identified as -American.          Calcium   Date Value Ref Range Status   08/27/2018 9.7 8.6 - 10.4 mg/dL Final     Total Protein   Date Value Ref Range Status   08/27/2018 7.0 6.1 - 8.1 g/dL Final     Albumin   Date Value Ref Range Status   08/27/2018 4.5 3.6 - 5.1 g/dL Final     Total Bilirubin   Date Value Ref Range Status   08/27/2018 0.5 0.2 - 1.2 mg/dL Final     Alkaline Phosphatase   Date Value Ref Range Status   08/27/2018 69 33 - 130 U/L Final     AST   Date Value Ref Range Status    08/27/2018 23 10 - 35 U/L Final     ALT   Date Value Ref Range Status   08/27/2018 25 6 - 29 U/L Final     eGFR if    Date Value Ref Range Status   08/27/2018 117 > OR = 60 mL/min/1.73m2 Final     eGFR if non    Date Value Ref Range Status   08/27/2018 101 > OR = 60 mL/min/1.73m2 Final     No results found for: CEA  No results found for: PSA        Assessment/Plan:     Problem List Items Addressed This Visit     History of breast cancer-2008 ; s/p bilateral masectomies with reconstruction form abdominal fat (Chronic)     Patient is doing well and remains FREDERICK at this time.  Exam is negative and she is doing well overall.  Will continue yearly visits.           Osteoporosis     Patient remains on calcium supplementation at this time.  No fractures and she seems to be doing well.  Has not started nasal spray.  Will reach out to Dr. LORETA Roca on this issue.                 Discussion:     Follow-up in about 1 year (around 2/5/2020).      Electronically signed by Delbert Roca

## 2019-04-22 ENCOUNTER — OFFICE VISIT (OUTPATIENT)
Dept: ALLERGY | Facility: CLINIC | Age: 54
End: 2019-04-22
Payer: OTHER GOVERNMENT

## 2019-04-22 VITALS
DIASTOLIC BLOOD PRESSURE: 80 MMHG | HEIGHT: 63 IN | WEIGHT: 161 LBS | SYSTOLIC BLOOD PRESSURE: 128 MMHG | BODY MASS INDEX: 28.53 KG/M2

## 2019-04-22 DIAGNOSIS — L50.9 URTICARIA: ICD-10-CM

## 2019-04-22 DIAGNOSIS — T78.3XXA ANGIOEDEMA, INITIAL ENCOUNTER: Primary | ICD-10-CM

## 2019-04-22 DIAGNOSIS — K21.9 LARYNGOPHARYNGEAL REFLUX (LPR): ICD-10-CM

## 2019-04-22 DIAGNOSIS — J31.0 CHRONIC RHINITIS: ICD-10-CM

## 2019-04-22 PROCEDURE — 99245 PR OFFICE CONSULTATION,LEVEL V: ICD-10-PCS | Mod: ,,, | Performed by: ALLERGY & IMMUNOLOGY

## 2019-04-22 PROCEDURE — 99245 OFF/OP CONSLTJ NEW/EST HI 55: CPT | Mod: ,,, | Performed by: ALLERGY & IMMUNOLOGY

## 2019-04-22 RX ORDER — AZELASTINE 1 MG/ML
1 SPRAY, METERED NASAL 2 TIMES DAILY
Qty: 30 ML | Refills: 3 | Status: SHIPPED | OUTPATIENT
Start: 2019-04-22 | End: 2021-01-26

## 2019-04-22 RX ORDER — BENZOCAINE .13; .15; .5; 2 G/100G; G/100G; G/100G; G/100G
1 GEL ORAL 2 TIMES DAILY
Qty: 8.6 G | Refills: 3 | Status: SHIPPED | OUTPATIENT
Start: 2019-04-22 | End: 2021-01-26

## 2019-04-22 NOTE — PATIENT INSTRUCTIONS
Nose:  Saline first 1-2 times per day  Next azelastine 1 spray per nare twice per day - if symptoms worsen, may use up to 4 times per day   Then rhinocort 1 spray per nare twice per day    Technique:  Head down  Aim up and out   Spray don't sniff      Reflux follow diet control and as needed medications    Swelling and itch follow action plan       Follow up in 2 months.

## 2019-04-22 NOTE — PROGRESS NOTES
"Subjective:       Patient ID: Enma Navarro is a 53 y.o. female.    Chief Complaint: Angioedema (Referred by Dr. ARTEMIO Villarreal for angioedema - has had 3 episodes of tongue swelling - all in Fall, last episode in Dec 2018.  Has not had problems in 2019)    HPI     Pt presents as a consult from Dr. Javon Villarreal for angioedema.     Onset: 2018  She had three episodes.   First was in September  - pt notes she was having an terrible reflux issue.  She has zantac that night and "threw it up" and took another one.   It woke her up with acid in her throat.   An hour later, her tongue felt "funny"  Her right side was swollen  Then a week later, the left side was swolllen  The first time went to ED and they gave her in [place] ED, 2 shots of epi and benadryl, solumedrol.     The second time, she took benadryl, but uncertain of efficacy.   Resolved after about 6-8 hours.   No further complication.     Third time was in December.   Immediately, she took 2 benadryl and tongue didn't swell, but resolved much faster.   Tongue felt "tingly."     Not returned in 2019.      She does note foot swelling with itch.     Pt does recall a "gnat bite" looking area on her neck. She shows me a pic on her phone that looks urticarial.    This phenomeon has been occurring for years.     She may have foot swelling with itch as well. Her throat may be pruritic.     Takes prilosec as needed. No association with ingestion and symptoms of itch or swelling.       Pt has a history of breast cancer- 2008. Took tamoxifen and another medicine. Took chemo.   Hysterectomy 2010    Finished her chemo in dec of 2008  Tamoxifen finished 2014.     Possible connection with her cancer tx and itching.     Fhx:  Mom thyroid issues, uncertain which type. ? Nodules   No urticaria or angioedema       Rhinitis  Onset childhood  Worse in spring  Takes zyrtec prn for it   Congestion pnd nasal drip    Has reflux frequ almost daily/nightly   Zantac prn and prilosec prn " "  Burning in throat  Worse at night      Review of Systems      General: neg unexpected weight changes, fevers, chills, night sweats, malaise  HEENT: see hpi, Neg eye pain, vision changes, ear drainage, nose bleeds, throat tightness, sores in the mouth  CV: Neg chest pain, palpitations, swelling  Resp: see hpi, neg shortness of breath, hemoptysis, cough  GI: see hpi, neg dysphagia, night abdominal pain, reflux, chronic diarrhea, chronic constipation  Derm: See Hpi, neg new rash, neg flushing  Mu/sk: Neg joint pain, joint swelling   Psych: Neg anxiety  neuro: neg chronic headaches, muscle weakness  Endo: neg heat/cold intolerance, chronic fatigue        Objective:     Vitals:    04/22/19 1042   BP: 128/80   Weight: 73 kg (161 lb)   Height: 5' 3" (1.6 m)        Physical Exam        General: no acute distress, well developed well nourished   HEENT:   Head:normocephalic atraumatic  Eyes: SETH, EOMI, Neg injection, scleral icterus, or conjunctival papillary hypertrophy.  Ears: tm clear bilaterally, normal canal  Nose: 2-3+ inferior turbinates pink, neg nasal polyps            Mucosa: moist             Septal irritation: neg   OP: mucus membranes moist, - cobblestoning, - PND, neg erythema or lesions  Neck: supple, Full range of motion, neg lymphadenopathy  Chest: full respiratory excursion no abnormal chest abnormality  Resp: clear to ascultation bilaterally  CV: RRR, neg MRG, brisk capillary refill  Abdomen: BS+, non tender, non distended  Ext:  Neg clubbing, cyanosis, pitting edema  Skin: Neg rashes or lesions  Lymph: neg supraclavicular, axillary     Assessment:       1. Angioedema, initial encounter    2. Urticaria    3. Chronic rhinitis    4. Laryngopharyngeal reflux (LPR)        Plan:       Angioedema, initial encounter  -     Protein electrophoresis, serum; Future; Expected date: 05/22/2019  -     C-reactive protein; Future; Expected date: 04/22/2019    Urticaria  -     Protein electrophoresis, serum; Future; " Expected date: 05/22/2019  -     C-reactive protein; Future; Expected date: 04/22/2019    Chronic rhinitis  -     budesonide (RINOCORT AQUA) 32 mcg/actuation nasal spray; 1 spray (32 mcg total) by Nasal route 2 (two) times daily.  Dispense: 8.6 g; Refill: 3  -     azelastine (ASTELIN) 137 mcg (0.1 %) nasal spray; 1 spray (137 mcg total) by Nasal route 2 (two) times daily.  Dispense: 30 mL; Refill: 3    Laryngopharyngeal reflux (LPR)             Chronic rhinitis:  Action plan   Start saline and comb therapy  Consider montelukast   Skin test procedure visit inhalant     Angioedema/urticaria  Labs at [facility] cbc, cmp, tsh, wnl from 8/2018 reviewed    lpr  Discussed sx and sinus relationship  infor given.     Follow up in 2 months        Didi Keller M.D.  Allergy/Immunology  [facility]   [phone] phone  [phone] fax

## 2019-04-24 ENCOUNTER — PATIENT MESSAGE (OUTPATIENT)
Dept: ALLERGY | Facility: CLINIC | Age: 54
End: 2019-04-24

## 2019-04-24 LAB
ALBUMIN SERPL ELPH-MCNC: 4.3 G/DL (ref 3.8–4.8)
ALPHA1 GLOB SERPL ELPH-MCNC: 0.3 G/DL (ref 0.2–0.3)
ALPHA2 GLOB SERPL ELPH-MCNC: 0.6 G/DL (ref 0.5–0.9)
BETA1 GLOB SERPL ELPH-MCNC: 0.4 G/DL (ref 0.4–0.6)
BETA2 GLOB SERPL ELPH-MCNC: 0.4 G/DL (ref 0.2–0.5)
CRP SERPL-MCNC: 18.6 MG/L
GAMMA GLOB SERPL ELPH-MCNC: 0.8 G/DL (ref 0.8–1.7)
PROT PATTERN SERPL ELPH-IMP: NORMAL
PROT SERPL-MCNC: 6.8 G/DL (ref 6.1–8.1)

## 2019-04-26 ENCOUNTER — PATIENT MESSAGE (OUTPATIENT)
Dept: ALLERGY | Facility: CLINIC | Age: 54
End: 2019-04-26

## 2019-06-11 ENCOUNTER — PROCEDURE VISIT (OUTPATIENT)
Dept: ALLERGY | Facility: CLINIC | Age: 54
End: 2019-06-11
Payer: OTHER GOVERNMENT

## 2019-06-11 VITALS — HEIGHT: 63 IN | WEIGHT: 163 LBS | OXYGEN SATURATION: 98 % | HEART RATE: 75 BPM | BODY MASS INDEX: 28.88 KG/M2

## 2019-06-11 DIAGNOSIS — J31.0 CHRONIC RHINITIS: Primary | ICD-10-CM

## 2019-06-11 PROCEDURE — 95004 PR ALLERGY SKIN TESTS,ALLERGENS: ICD-10-PCS | Mod: ,,, | Performed by: ALLERGY & IMMUNOLOGY

## 2019-06-11 PROCEDURE — 95004 PERQ TESTS W/ALRGNC XTRCS: CPT | Mod: ,,, | Performed by: ALLERGY & IMMUNOLOGY

## 2019-06-24 ENCOUNTER — TELEPHONE (OUTPATIENT)
Dept: FAMILY MEDICINE | Facility: CLINIC | Age: 54
End: 2019-06-24

## 2019-06-24 ENCOUNTER — OFFICE VISIT (OUTPATIENT)
Dept: ALLERGY | Facility: CLINIC | Age: 54
End: 2019-06-24
Payer: OTHER GOVERNMENT

## 2019-06-24 VITALS
BODY MASS INDEX: 28.88 KG/M2 | SYSTOLIC BLOOD PRESSURE: 122 MMHG | HEIGHT: 63 IN | WEIGHT: 163 LBS | DIASTOLIC BLOOD PRESSURE: 76 MMHG
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
## 2019-06-24 DIAGNOSIS — T78.3XXD ANGIOEDEMA, SUBSEQUENT ENCOUNTER: Primary | ICD-10-CM

## 2019-06-24 DIAGNOSIS — J30.1 SEASONAL ALLERGIC RHINITIS DUE TO POLLEN: ICD-10-CM

## 2019-06-24 PROCEDURE — 99214 PR OFFICE/OUTPT VISIT, EST, LEVL IV, 30-39 MIN: ICD-10-PCS | Mod: ,,, | Performed by: ALLERGY & IMMUNOLOGY

## 2019-06-24 PROCEDURE — 99214 OFFICE O/P EST MOD 30 MIN: CPT | Mod: ,,, | Performed by: ALLERGY & IMMUNOLOGY

## 2019-06-24 RX ORDER — DOXYCYCLINE 100 MG/1
100 CAPSULE ORAL 2 TIMES DAILY
Qty: 20 CAPSULE | Refills: 0 | Status: SHIPPED | OUTPATIENT
Start: 2019-06-24 | End: 2019-07-04

## 2019-06-24 RX ORDER — METRONIDAZOLE 500 MG/1
500 TABLET ORAL EVERY 8 HOURS
Qty: 30 TABLET | Refills: 0 | Status: SHIPPED | OUTPATIENT
Start: 2019-06-24 | End: 2019-07-04

## 2019-06-24 NOTE — PROGRESS NOTES
"Subjective:       Patient ID: Enma Navarro is a 54 y.o. female.    Chief Complaint: Angioedema (better ) and Allergic Rhinitis  (better )    HPI     Pt presents for angioedema.     Condition: improved, not recurred since her foot in May.   Onset: 2018  She had three episodes.   First was in September  - pt notes she was having an terrible reflux issue.  She has zantac that night and "threw it up" and took another one.   It woke her up with acid in her throat.   An hour later, her tongue felt "funny"  Her right side was swollen  Then a week later, the left side was swolllen  The first time went to ED and they gave her in [place] ED, 2 shots of epi and benadryl, solumedrol.     The second time, she took benadryl, but uncertain of efficacy.   Resolved after about 6-8 hours.   No further complication.     Third time was in December.   Immediately, she took 2 benadryl and tongue didn't swell, but resolved much faster.   Tongue felt "tingly."     Not returned in 2019.      She does note foot swelling with itch.     Pt does recall a "gnat bite" looking area on her neck. She shows me a pic on her phone that looks urticarial.    This phenomeon has been occurring for years.     She may have foot swelling with itch as well. Her throat may be pruritic.     Takes prilosec as needed. No association with ingestion and symptoms of itch or swelling.       Pt has a history of breast cancer- 2008. Took tamoxifen and another medicine. Took chemo.   Hysterectomy 2010    Finished her chemo in dec of 2008  Tamoxifen finished 2014.     Possible connection with her cancer tx and itching.     Fhx:  Mom thyroid issues, uncertain which type. ? Nodules   No urticaria or angioedema       Allergic Rhinitis  Condition is improved.  Started rhinocort and azelastine, stopped routine use.    Onset childhood  Worse in spring  Takes zyrtec prn for it   Congestion pnd nasal drip  Skin prick test: 6-2019- dust mite, all grass, weeds    Has reflux " "frequ almost daily/nightly   Zantac prn and prilosec prn   Burning in throat  Worse at night      Pt had a cat bite on the back of her leg on the right.   Since that time, the bite is painful, getting red.   She feels that it may be oozing.   Denies any fevers.   Bite occurred Friday night > 48 hours.   Cat is vaccinated.     History of pcn allergy  Baby, rash  Uncertain if anaphylactic  Been avoiding all her life.     Component      Latest Ref Rng & Units 4/22/2019   Albumin      3.8 - 4.8 g/dL 4.3   Alpha-1-Globulins      0.2 - 0.3 g/dL 0.3   Alpha-2-Globulins      0.5 - 0.9 g/dL 0.6   Beta Globulin      0.4 - 0.6 g/dL 0.4   Beta-2 Microglobulin      0.2 - 0.5 g/dL 0.4   Gamma Globulin      0.8 - 1.7 g/dL 0.8   Interpretation                Review of Systems      General: neg unexpected weight changes, fevers, chills, night sweats, malaise  HEENT: see hpi, Neg eye pain, vision changes, ear drainage, nose bleeds, throat tightness, sores in the mouth  CV: Neg chest pain, palpitations, swelling  Resp: see hpi, neg shortness of breath, hemoptysis, cough  GI: see hpi, neg dysphagia, night abdominal pain, reflux, chronic diarrhea, chronic constipation  Derm: See Hpi, neg new rash, neg flushing  Mu/sk: Neg joint pain, joint swelling   Psych: Neg anxiety  neuro: neg chronic headaches, muscle weakness  Endo: neg heat/cold intolerance, chronic fatigue        Objective:     Vitals:    06/24/19 1306   BP: 122/76   Weight: 73.9 kg (163 lb)   Height: 5' 3" (1.6 m)        Physical Exam        General: no acute distress, well developed well nourished   Skin: right posterior thigh with bruising and erythema surrounding bite marks.       Assessment:       1. Angioedema, subsequent encounter    2. Seasonal allergic rhinitis due to pollen    3. Cat bite, initial encounter        Plan:       Angioedema, subsequent encounter    Seasonal allergic rhinitis due to pollen    Cat bite, initial encounter  -     doxycycline (VIBRAMYCIN) 100 " MG Cap; Take 1 capsule (100 mg total) by mouth 2 (two) times daily. for 10 days  Dispense: 20 capsule; Refill: 0  -     metroNIDAZOLE (FLAGYL) 500 MG tablet; Take 1 tablet (500 mg total) by mouth every 8 (eight) hours. for 10 days  Dispense: 30 tablet; Refill: 0             allergic rhinitis:  Action plan   saline and comb therapy  Consider montelukast   Skin test procedure visit inhalant - dm all grass, weeds, alternaria     Angioedema/urticaria  Labs at [facility] cbc, cmp, tsh, wnl from 8/2018 reviewed  Improved  spep normal.     lpr  Discussed sx and sinus relationship  infor given.     Cat bite:  uptodate recommends - pcn allergic patient.   Doxycycline and metronidazole.     History of penicillin allergy  rec skin prick testing.       Face to face >50% discussing cat bite treatment reviewing testing and rhinitis/angioedema management > 35 mins.       Follow up in 6-12 months         Didi Keller M.D.  Allergy/Immunology  [facility]   [phone] phone  [phone] fax

## 2020-02-05 ENCOUNTER — OFFICE VISIT (OUTPATIENT)
Dept: HEMATOLOGY/ONCOLOGY | Facility: CLINIC | Age: 55
End: 2020-02-05
Payer: OTHER GOVERNMENT

## 2020-02-05 VITALS
HEART RATE: 99 BPM | BODY MASS INDEX: 28.7 KG/M2 | DIASTOLIC BLOOD PRESSURE: 81 MMHG | TEMPERATURE: 99 F | SYSTOLIC BLOOD PRESSURE: 140 MMHG | RESPIRATION RATE: 19 BRPM | WEIGHT: 162 LBS

## 2020-02-05 DIAGNOSIS — Z85.3 HISTORY OF BREAST CANCER: Chronic | ICD-10-CM

## 2020-02-05 PROCEDURE — 99213 PR OFFICE/OUTPT VISIT, EST, LEVL III, 20-29 MIN: ICD-10-PCS | Mod: S$GLB,,, | Performed by: INTERNAL MEDICINE

## 2020-02-05 PROCEDURE — 99213 OFFICE O/P EST LOW 20 MIN: CPT | Mod: S$GLB,,, | Performed by: INTERNAL MEDICINE

## 2020-02-05 NOTE — ASSESSMENT & PLAN NOTE
Patient is doing very well and appears FREDERICK at this time.  Will continue with yearly follow and will get mammogram in August.  No new issues otherwise.

## 2020-02-05 NOTE — PROGRESS NOTES
PROGRESS NOTE    Subjective:       Patient ID: Enma Navarro is a 54 y.o. female.    Breast cancer 2008  Bilateral mastectomy/tissue reconstruc    Chief Complaint:  No chief complaint on file.  breast cancer follow up.     History of Present Illness:   Enma Navarro is a 54 y.o. female who presents for routine yearly follow up .     No new complaints at this time.     mammo negative 8/9/2018    Family and Social history reviewed and is unchanged from 7/29/2014.       ROS:  Review of Systems   Constitutional: Negative for fever.   Respiratory: Negative for shortness of breath.    Cardiovascular: Negative for chest pain and leg swelling.   Gastrointestinal: Negative for abdominal pain and blood in stool.   Genitourinary: Negative for hematuria.   Skin: Negative for rash.          Current Outpatient Medications:     budesonide (RINOCORT AQUA) 32 mcg/actuation nasal spray, 1 spray (32 mcg total) by Nasal route 2 (two) times daily., Disp: 8.6 g, Rfl: 3    calcium carbonate (TUMS) 200 mg calcium (500 mg) chewable tablet, Take 1 tablet by mouth once daily., Disp: , Rfl:     calcium-vitamin D3 (CALCIUM 500 + D) 500 mg(1,250mg) -200 unit per tablet, Take 1 tablet by mouth 2 (two) times daily with meals., Disp: , Rfl:     ergocalciferol, vitamin D2, (VITAMIN D ORAL), Take by mouth., Disp: , Rfl:     multivitamin (ONE DAILY MULTIVITAMIN) per tablet, Take 1 tablet by mouth once daily., Disp: , Rfl:     azelastine (ASTELIN) 137 mcg (0.1 %) nasal spray, 1 spray (137 mcg total) by Nasal route 2 (two) times daily., Disp: 30 mL, Rfl: 3    calcitonin, salmon, (FORTICAL) 200 unit/actuation nasal spray, 1 spray by Nasal route once daily., Disp: 1 Bottle, Rfl: 11    omeprazole (PRILOSEC) 20 MG capsule, Take 1 capsule (20 mg total) by mouth once daily., Disp: 30 capsule, Rfl: 5        Objective:       Physical Examination:     BP (!) 140/81   Pulse 99   Temp 99.3 °F  (37.4 °C) (Oral)   Resp 19   Wt 73.5 kg (162 lb)   BMI 28.70 kg/m²     Physical Exam   Constitutional: She appears well-developed and well-nourished.   HENT:   Head: Normocephalic and atraumatic.   Right Ear: External ear normal.   Left Ear: External ear normal.   Mouth/Throat: Oropharynx is clear and moist.   Eyes: Pupils are equal, round, and reactive to light. Conjunctivae are normal.   Neck: No tracheal deviation present. No thyromegaly present.   Cardiovascular: Normal rate, regular rhythm and normal heart sounds.   Pulmonary/Chest: Effort normal and breath sounds normal.       Abdominal: Soft. Bowel sounds are normal. She exhibits no distension and no mass. There is no tenderness.   Musculoskeletal: She exhibits no edema.   Neurological:   Neuro intact througout   Skin: No rash noted.   Psychiatric: She has a normal mood and affect. Her behavior is normal. Judgment and thought content normal.       Labs:   No results found for this or any previous visit (from the past 336 hour(s)).  CMP  Sodium   Date Value Ref Range Status   08/27/2018 142 135 - 146 mmol/L Final     Potassium   Date Value Ref Range Status   08/27/2018 4.4 3.5 - 5.3 mmol/L Final     Chloride   Date Value Ref Range Status   08/27/2018 107 98 - 110 mmol/L Final     CO2   Date Value Ref Range Status   08/27/2018 25 20 - 32 mmol/L Final     Glucose   Date Value Ref Range Status   08/27/2018 92 65 - 99 mg/dL Final     Comment:                   Fasting reference interval          BUN, Bld   Date Value Ref Range Status   08/27/2018 15 7 - 25 mg/dL Final     Creatinine   Date Value Ref Range Status   08/27/2018 0.69 0.50 - 1.05 mg/dL Final     Comment:     For patients >49 years of age, the reference limit  for Creatinine is approximately 13% higher for people  identified as -American.          Calcium   Date Value Ref Range Status   08/27/2018 9.7 8.6 - 10.4 mg/dL Final     Total Protein   Date Value Ref Range Status   04/22/2019 6.8 6.1 -  8.1 g/dL Final     Albumin   Date Value Ref Range Status   08/27/2018 4.5 3.6 - 5.1 g/dL Final     Total Bilirubin   Date Value Ref Range Status   08/27/2018 0.5 0.2 - 1.2 mg/dL Final     Alkaline Phosphatase   Date Value Ref Range Status   08/27/2018 69 33 - 130 U/L Final     AST   Date Value Ref Range Status   08/27/2018 23 10 - 35 U/L Final     ALT   Date Value Ref Range Status   08/27/2018 25 6 - 29 U/L Final     eGFR if    Date Value Ref Range Status   08/27/2018 117 > OR = 60 mL/min/1.73m2 Final     eGFR if non    Date Value Ref Range Status   08/27/2018 101 > OR = 60 mL/min/1.73m2 Final     No results found for: CEA  No results found for: PSA        Assessment/Plan:     Problem List Items Addressed This Visit     History of breast cancer-2008 ; s/p bilateral masectomies with reconstruction form abdominal fat (Chronic)     Patient is doing very well and appears FREDERICK at this time.  Will continue with yearly follow and will get mammogram in August.  No new issues otherwise.          Relevant Orders    Mammo Digital Diagnostic Bilat w/ Felix    US Breast Bilateral Complete          Discussion:     Follow up in about 1 year (around 2/5/2021).      Electronically signed by Delbert Roca

## 2020-08-03 ENCOUNTER — HOSPITAL ENCOUNTER (OUTPATIENT)
Dept: RADIOLOGY | Facility: HOSPITAL | Age: 55
Discharge: HOME | End: 2020-08-03
Attending: INTERNAL MEDICINE
Payer: OTHER GOVERNMENT

## 2020-08-03 DIAGNOSIS — Z85.3 HISTORY OF BREAST CANCER: Chronic | ICD-10-CM

## 2020-08-03 DIAGNOSIS — Z85.3 HISTORY OF BREAST CANCER: ICD-10-CM

## 2020-08-03 PROCEDURE — 77062 BREAST TOMOSYNTHESIS BI: CPT | Mod: TC,PO

## 2020-10-01 ENCOUNTER — TELEPHONE (OUTPATIENT)
Dept: CARDIOLOGY | Facility: CLINIC | Age: 55
End: 2020-10-01

## 2020-10-01 NOTE — TELEPHONE ENCOUNTER
Left message, her appointment did not cross over to [facility] for today, need to reschedule.

## 2021-01-19 ENCOUNTER — TELEPHONE (OUTPATIENT)
Dept: CARDIOLOGY | Facility: CLINIC | Age: 56
End: 2021-01-19

## 2021-01-26 ENCOUNTER — OFFICE VISIT (OUTPATIENT)
Dept: CARDIOLOGY | Facility: CLINIC | Age: 56
End: 2021-01-26
Payer: OTHER GOVERNMENT

## 2021-01-26 VITALS
WEIGHT: 162 LBS | HEIGHT: 63 IN | SYSTOLIC BLOOD PRESSURE: 122 MMHG | OXYGEN SATURATION: 99 % | RESPIRATION RATE: 16 BRPM | DIASTOLIC BLOOD PRESSURE: 84 MMHG | BODY MASS INDEX: 28.7 KG/M2 | HEART RATE: 86 BPM

## 2021-01-26 DIAGNOSIS — E78.5 DYSLIPIDEMIA: Primary | ICD-10-CM

## 2021-01-26 DIAGNOSIS — K21.9 GASTROESOPHAGEAL REFLUX DISEASE WITHOUT ESOPHAGITIS: ICD-10-CM

## 2021-01-26 DIAGNOSIS — R53.83 FATIGUE, UNSPECIFIED TYPE: ICD-10-CM

## 2021-01-26 DIAGNOSIS — I10 HYPERTENSION, UNSPECIFIED TYPE: ICD-10-CM

## 2021-01-26 PROCEDURE — 99213 OFFICE O/P EST LOW 20 MIN: CPT | Mod: S$GLB,,, | Performed by: INTERNAL MEDICINE

## 2021-01-26 PROCEDURE — 99213 PR OFFICE/OUTPT VISIT, EST, LEVL III, 20-29 MIN: ICD-10-PCS | Mod: S$GLB,,, | Performed by: INTERNAL MEDICINE

## 2021-01-26 RX ORDER — MINERAL OIL
180 ENEMA (ML) RECTAL DAILY
COMMUNITY

## 2021-02-08 ENCOUNTER — OFFICE VISIT (OUTPATIENT)
Dept: HEMATOLOGY/ONCOLOGY | Facility: CLINIC | Age: 56
End: 2021-02-08
Payer: OTHER GOVERNMENT

## 2021-02-08 VITALS
SYSTOLIC BLOOD PRESSURE: 150 MMHG | WEIGHT: 163.19 LBS | HEART RATE: 103 BPM | BODY MASS INDEX: 28.91 KG/M2 | TEMPERATURE: 98 F | RESPIRATION RATE: 17 BRPM | DIASTOLIC BLOOD PRESSURE: 92 MMHG

## 2021-02-08 DIAGNOSIS — Z85.3 HISTORY OF BREAST CANCER: Chronic | ICD-10-CM

## 2021-02-08 DIAGNOSIS — Z85.3 PERSONAL HISTORY OF MALIGNANT NEOPLASM OF BREAST: Primary | ICD-10-CM

## 2021-02-08 PROCEDURE — 99212 PR OFFICE/OUTPT VISIT, EST, LEVL II, 10-19 MIN: ICD-10-PCS | Mod: S$GLB,,, | Performed by: INTERNAL MEDICINE

## 2021-02-08 PROCEDURE — 99212 OFFICE O/P EST SF 10 MIN: CPT | Mod: S$GLB,,, | Performed by: INTERNAL MEDICINE

## 2021-07-15 ENCOUNTER — TELEPHONE (OUTPATIENT)
Dept: HEMATOLOGY/ONCOLOGY | Facility: CLINIC | Age: 56
End: 2021-07-15

## 2021-07-15 DIAGNOSIS — Z12.31 SCREENING MAMMOGRAM FOR HIGH-RISK PATIENT: Primary | ICD-10-CM

## 2021-08-04 ENCOUNTER — HOSPITAL ENCOUNTER (OUTPATIENT)
Dept: RADIOLOGY | Facility: HOSPITAL | Age: 56
Discharge: HOME | End: 2021-08-04
Attending: INTERNAL MEDICINE
Payer: OTHER GOVERNMENT

## 2021-08-04 DIAGNOSIS — Z12.31 SCREENING MAMMOGRAM FOR HIGH-RISK PATIENT: ICD-10-CM

## 2021-08-04 PROCEDURE — 77067 SCR MAMMO BI INCL CAD: CPT | Mod: TC,PO

## 2021-09-14 LAB
ALBUMIN SERPL-MCNC: 4.3 G/DL (ref 3.6–5.1)
ALBUMIN/GLOB SERPL: 1.6 (CALC) (ref 1–2.5)
ALP SERPL-CCNC: 60 U/L (ref 37–153)
ALT SERPL-CCNC: 50 U/L (ref 6–29)
AST SERPL-CCNC: 45 U/L (ref 10–35)
BASOPHILS # BLD AUTO: 31 CELLS/UL (ref 0–200)
BASOPHILS NFR BLD AUTO: 0.8 %
BILIRUB SERPL-MCNC: 0.4 MG/DL (ref 0.2–1.2)
BUN SERPL-MCNC: 13 MG/DL (ref 7–25)
BUN/CREAT SERPL: ABNORMAL (CALC) (ref 6–22)
CALCIUM SERPL-MCNC: 9.1 MG/DL (ref 8.6–10.4)
CHLORIDE SERPL-SCNC: 106 MMOL/L (ref 98–110)
CHOLEST SERPL-MCNC: 212 MG/DL
CHOLEST/HDLC SERPL: 4.5 (CALC)
CO2 SERPL-SCNC: 26 MMOL/L (ref 20–32)
CREAT SERPL-MCNC: 0.63 MG/DL (ref 0.5–1.05)
EOSINOPHIL # BLD AUTO: 234 CELLS/UL (ref 15–500)
EOSINOPHIL NFR BLD AUTO: 6 %
ERYTHROCYTE [DISTWIDTH] IN BLOOD BY AUTOMATED COUNT: 12.5 % (ref 11–15)
GLOBULIN SER CALC-MCNC: 2.7 G/DL (CALC) (ref 1.9–3.7)
GLUCOSE SERPL-MCNC: 78 MG/DL (ref 65–99)
HCT VFR BLD AUTO: 40.4 % (ref 35–45)
HDLC SERPL-MCNC: 47 MG/DL
HGB BLD-MCNC: 14.1 G/DL (ref 11.7–15.5)
LDLC SERPL CALC-MCNC: 135 MG/DL (CALC)
LYMPHOCYTES # BLD AUTO: 1092 CELLS/UL (ref 850–3900)
LYMPHOCYTES NFR BLD AUTO: 28 %
MCH RBC QN AUTO: 30.4 PG (ref 27–33)
MCHC RBC AUTO-ENTMCNC: 34.9 G/DL (ref 32–36)
MCV RBC AUTO: 87.1 FL (ref 80–100)
MONOCYTES # BLD AUTO: 413 CELLS/UL (ref 200–950)
MONOCYTES NFR BLD AUTO: 10.6 %
NEUTROPHILS # BLD AUTO: 2129 CELLS/UL (ref 1500–7800)
NEUTROPHILS NFR BLD AUTO: 54.6 %
NONHDLC SERPL-MCNC: 165 MG/DL (CALC)
PLATELET # BLD AUTO: 153 THOUSAND/UL (ref 140–400)
PMV BLD REES-ECKER: 10.4 FL (ref 7.5–12.5)
POTASSIUM SERPL-SCNC: 3.9 MMOL/L (ref 3.5–5.3)
PROT SERPL-MCNC: 7 G/DL (ref 6.1–8.1)
RBC # BLD AUTO: 4.64 MILLION/UL (ref 3.8–5.1)
SODIUM SERPL-SCNC: 142 MMOL/L (ref 135–146)
TRIGL SERPL-MCNC: 160 MG/DL
TSH SERPL-ACNC: 1.37 MIU/L
WBC # BLD AUTO: 3.9 THOUSAND/UL (ref 3.8–10.8)

## 2021-11-11 ENCOUNTER — TELEPHONE (OUTPATIENT)
Dept: CARDIOLOGY | Facility: CLINIC | Age: 56
End: 2021-11-11
Payer: OTHER GOVERNMENT

## 2021-11-11 NOTE — TELEPHONE ENCOUNTER
----- Message from [facility] sent at 11/11/2021  4:10 PM CST -----  .Type: Appointment Request     Caller is requesting appointment for /113 /90 ON 11/11/2021    Was A Appointment Solution Found When Scheduling? NONE     Best Call Back Number: [phone]    Additional Information:  NONE

## 2022-01-25 ENCOUNTER — OFFICE VISIT (OUTPATIENT)
Dept: CARDIOLOGY | Facility: CLINIC | Age: 57
End: 2022-01-25
Payer: OTHER GOVERNMENT

## 2022-01-25 VITALS
SYSTOLIC BLOOD PRESSURE: 120 MMHG | WEIGHT: 165 LBS | RESPIRATION RATE: 16 BRPM | OXYGEN SATURATION: 99 % | HEIGHT: 63 IN | DIASTOLIC BLOOD PRESSURE: 78 MMHG | BODY MASS INDEX: 29.23 KG/M2 | HEART RATE: 96 BPM

## 2022-01-25 DIAGNOSIS — R06.02 SHORTNESS OF BREATH: ICD-10-CM

## 2022-01-25 DIAGNOSIS — E78.5 DYSLIPIDEMIA: ICD-10-CM

## 2022-01-25 DIAGNOSIS — R53.83 FATIGUE, UNSPECIFIED TYPE: ICD-10-CM

## 2022-01-25 DIAGNOSIS — K21.9 GASTROESOPHAGEAL REFLUX DISEASE WITHOUT ESOPHAGITIS: ICD-10-CM

## 2022-01-25 DIAGNOSIS — T78.3XXD ANGIOEDEMA, SUBSEQUENT ENCOUNTER: ICD-10-CM

## 2022-01-25 PROCEDURE — 93000 ELECTROCARDIOGRAM COMPLETE: CPT | Mod: S$GLB,,, | Performed by: INTERNAL MEDICINE

## 2022-01-25 PROCEDURE — 99214 OFFICE O/P EST MOD 30 MIN: CPT | Mod: S$GLB,,, | Performed by: INTERNAL MEDICINE

## 2022-01-25 PROCEDURE — 99214 PR OFFICE/OUTPT VISIT, EST, LEVL IV, 30-39 MIN: ICD-10-PCS | Mod: S$GLB,,, | Performed by: INTERNAL MEDICINE

## 2022-01-25 PROCEDURE — 93000 EKG 12-LEAD: ICD-10-PCS | Mod: S$GLB,,, | Performed by: INTERNAL MEDICINE

## 2022-01-25 RX ORDER — FAMOTIDINE 20 MG/1
20 TABLET, FILM COATED ORAL DAILY PRN
COMMUNITY

## 2022-01-25 NOTE — ASSESSMENT & PLAN NOTE
After 2 episodes of angioedema she has not had any recurrence of the same.  Will continue on monitoring and her present medical regimen

## 2022-01-25 NOTE — ASSESSMENT & PLAN NOTE
She has dyslipidemia with significant elevation of LDL cholesterol she wants to try this on a conservative measures without medication arm this has been limiting her because of her foot injury ankle injury.  Arm will recheck her LDL cholesterol but it is prudent to consider having a statin therapy or like agents to reduce LDL cholesterol risk factor modification.

## 2022-01-25 NOTE — PROGRESS NOTES
Subjective:    Patient ID:  Enma Navarro is a 56 y.o. female patient here for evaluation Dyslipidemia and Shortness of Breath (Right after she got her second dose of the covid vaccine she has experienced some sob and a tightness in the upper center part of her chest)      History of Present Illness:   PATIENT IS HERE FOR FOLLOW-UP EVALUATION.  SHE REPORTS INTERMITTENT EPISODES OF TRANSIENT SHORTNESS OF BREATH THUMBS-UP TIGHTNESS IN THE SUBSTERNAL ARTERY AREA AND ARE UPPER CHEST.  AND SHE HAD A 2ND COVID VACCINE IN SEPTEMBER SINCE THEN SHE HAD ABOUT 3 EPISODES ALTHOUGH NOT NECESSARILY RELATED TO IT.  THERE IS NO RADIATION OF PAIN TO ARM NECK OR JAW THIS RESOLVED SPONTANEOUSLY THERE IS NO COUGH OR CONGESTION NO FEVERS OR CHILLS NOTED.  No nausea vomiting no blood in the stools or black stools effort capacity arm is good and does not tire easily.  She did notice some elevation of her blood pressures on a few recordings more recently with a urgent care center where she was diagnosed with the UTI and follow-up evaluation there.    She had angioedema and to different occasions and the loss associated with swelling of her time as well.  No definitive arm precipitating factors noted  She also had are mediastinal biopsy done and diagnosed with sarcoidosis are previously in       Review of patient's allergies indicates:   Allergen Reactions    Penicillins      Reaction as a baby, rash.        Past Medical History:   Diagnosis Date    Allergy     Angio-edema     Breast cancer     Cancer     breast cancer     Past Surgical History:   Procedure Laterality Date    BREAST BIOPSY Right     BREAST SURGERY      double mastectomy w/reconstruction     SECTION      2 c-sections    COLONOSCOPY N/A 2018    Procedure: COLONOSCOPY;  Surgeon: Ellis Gutierres MD;  [facility];  Service: Endoscopy;  Laterality: N/A;    ESOPHAGOGASTRODUODENOSCOPY N/A 10/11/2018    Procedure: EGD  (ESOPHAGOGASTRODUODENOSCOPY);  Surgeon: Ellis Gutierres MD;  Location: [facility];  Service: Endoscopy;  Laterality: N/A;    FINGER SURGERY      left index finger    HYSTERECTOMY      MASTECTOMY      TONSILLECTOMY       Social History     Tobacco Use    Smoking status: Never Smoker    Smokeless tobacco: Never Used   Substance Use Topics    Alcohol use: No    Drug use: No        Review of Systems:    As noted in HPI in addition      REVIEW OF SYSTEMS  CARDIOVASCULAR: No recent chest pain, palpitations, arm, neck, or jaw pain  RESPIRATORY: No recent fever, cough chills, 3 episodes of shortness of breath as discussed above : No blood in the urine  GI: No Nausea, vomiting, constipation, diarrhea, blood, or reflux.  MUSCULOSKELETAL: No myalgias  NEURO: No lightheadedness or dizziness  EYES: No Double vision, blurry, vision or headache              Objective        Vitals:    01/25/22 1614   BP: 120/78   Pulse: 96   Resp: 16       LIPIDS - LAST 2   Lab Results   Component Value Date    CHOL 212 (H) 09/13/2021    CHOL 237 (H) 08/27/2018    HDL 47 (L) 09/13/2021    HDL 41 (L) 08/27/2018    LDLCALC 135 (H) 09/13/2021    LDLCALC 164 (H) 08/27/2018    TRIG 160 (H) 09/13/2021    TRIG 167 (H) 08/27/2018    CHOLHDL 4.5 09/13/2021    CHOLHDL 5.8 (H) 08/27/2018       CBC - LAST 2  Lab Results   Component Value Date    WBC 3.9 09/13/2021    WBC 3.7 (L) 08/27/2018    RBC 4.64 09/13/2021    RBC 4.72 08/27/2018    HGB 14.1 09/13/2021    HGB 14.2 08/27/2018    HCT 40.4 09/13/2021    HCT 41.1 08/27/2018    MCV 87.1 09/13/2021    MCV 87.1 08/27/2018    MCH 30.4 09/13/2021    MCH 30.1 08/27/2018    MCHC 34.9 09/13/2021    MCHC 34.5 08/27/2018    RDW 12.5 09/13/2021    RDW 12.8 08/27/2018     09/13/2021     08/27/2018    MPV 10.4 09/13/2021    MPV 10.5 08/27/2018    LYMPH 1,092 09/13/2021    LYMPH 28.0 09/13/2021    MONO 413 09/13/2021    MONO 10.6 09/13/2021    BASO 31 09/13/2021    BASO 30 08/27/2018        CHEMISTRY & LIVER FUNCTION - LAST 2  Lab Results   Component Value Date     09/13/2021     08/27/2018    K 3.9 09/13/2021    K 4.4 08/27/2018     09/13/2021     08/27/2018    CO2 26 09/13/2021    CO2 25 08/27/2018    BUN 13 09/13/2021    BUN 15 08/27/2018    CREATININE 0.63 09/13/2021    CREATININE 0.69 08/27/2018    GLU 78 09/13/2021    GLU 92 08/27/2018    CALCIUM 9.1 09/13/2021    CALCIUM 9.7 08/27/2018    ALBUMIN 4.3 09/13/2021    ALBUMIN 4.5 08/27/2018    PROT 7.0 09/13/2021    PROT 6.8 04/22/2019    ALKPHOS 69 08/27/2018    ALT 50 (H) 09/13/2021    ALT 25 08/27/2018    AST 45 (H) 09/13/2021    AST 23 08/27/2018    BILITOT 0.4 09/13/2021    BILITOT 0.5 08/27/2018        CARDIAC PROFILE - LAST 2  No results found for: BNP, CPK, CPKMB, LDH, TROPONINI     COAGULATION - LAST 2  No results found for: LABPT, INR, APTT    ENDOCRINE & PSA - LAST 2  Lab Results   Component Value Date    TSH 1.37 09/13/2021    TSH 1.24 08/27/2018        ECHOCARDIOGRAM RESULTS  No results found for this or any previous visit.      CURRENT/PREVIOUS VISIT EKG  No results found for this or any previous visit.  No valid procedures specified.   No results found for this or any previous visit.    No valid procedures specified.    PHYSICAL EXAM  CONSTITUTIONAL: Well built, well nourished in no apparent distress  NECK: no carotid bruit, no JVD  LUNGS: CTA  CHEST WALL: no tenderness  HEART: regular rate and rhythm, S1, S2 normal, no murmur, click, rub or gallop   ABDOMEN: soft, non-tender; bowel sounds normal; no masses,  no organomegaly  EXTREMITIES: Extremities normal, no edema, no calf tenderness noted  NEURO: AAO X 3    I HAVE REVIEWED :    The vital signs, nurses notes, and all the pertinent radiology and labs.    01/25/2022 EKG shows normal sinus rhythm minimal criteria for LVH is noted possibly non variant of normal arm otherwise no acute ST changes noted.    Current Outpatient Medications   Medication  Instructions    calcium carbonate (TUMS) 200 mg calcium (500 mg) chewable tablet 1 tablet, Oral, Daily, prn    calcium-vitamin D3 (OS-LIANA 500 + D3) 500 mg-5 mcg (200 unit) per tablet 1 tablet, Oral, 2 times daily with meals    famotidine (PEPCID) 20 mg, Oral, Daily PRN, prn    fexofenadine (ALLEGRA) 180 mg, Oral, Daily, prn     multivitamin (THERAGRAN) per tablet 1 tablet, Oral, Daily          Assessment & Plan     Dyslipidemia  She has dyslipidemia with significant elevation of LDL cholesterol she wants to try this on a conservative measures without medication arm this has been limiting her because of her foot injury ankle injury.  Arm will recheck her LDL cholesterol but it is prudent to consider having a statin therapy or like agents to reduce LDL cholesterol risk factor modification.    Shortness of breath  This has unclear etiology.  And her EKG today shows sinus rhythm with borderline LVH recommend to obtain a symptom limited exercise stress test with myocardial perfusion imaging agent to evaluate for coronary insufficiency that could be contributing to his symptoms of chest pain and shortness of breath.  Echocardiogram for LV function assessment.  Especially that she has received chemo agents for her breast cancer    Gastroesophageal reflux disease  She has maintain on Pepcid with reasonable control of her acid reflux    Fatigue  This  has improved and resolved.    Angio-edema  After 2 episodes of angioedema she has not had any recurrence of the same.  Will continue on monitoring and her present medical regimen          No follow-ups on file.

## 2022-01-25 NOTE — ASSESSMENT & PLAN NOTE
This has unclear etiology.  And her EKG today shows sinus rhythm with borderline LVH recommend to obtain a symptom limited exercise stress test with myocardial perfusion imaging agent to evaluate for coronary insufficiency that could be contributing to his symptoms of chest pain and shortness of breath.  Echocardiogram for LV function assessment.  Especially that she has received chemo agents for her breast cancer

## 2022-02-09 ENCOUNTER — OFFICE VISIT (OUTPATIENT)
Dept: HEMATOLOGY/ONCOLOGY | Facility: CLINIC | Age: 57
End: 2022-02-09
Payer: OTHER GOVERNMENT

## 2022-02-09 VITALS
RESPIRATION RATE: 18 BRPM | BODY MASS INDEX: 28.88 KG/M2 | DIASTOLIC BLOOD PRESSURE: 87 MMHG | HEART RATE: 99 BPM | SYSTOLIC BLOOD PRESSURE: 158 MMHG | WEIGHT: 163 LBS | TEMPERATURE: 98 F | HEIGHT: 63 IN

## 2022-02-09 DIAGNOSIS — Z85.3 HISTORY OF BREAST CANCER: Chronic | ICD-10-CM

## 2022-02-09 DIAGNOSIS — R74.01 TRANSAMINITIS: ICD-10-CM

## 2022-02-09 PROCEDURE — 99213 PR OFFICE/OUTPT VISIT, EST, LEVL III, 20-29 MIN: ICD-10-PCS | Mod: S$GLB,,, | Performed by: INTERNAL MEDICINE

## 2022-02-09 PROCEDURE — 99213 OFFICE O/P EST LOW 20 MIN: CPT | Mod: S$GLB,,, | Performed by: INTERNAL MEDICINE

## 2022-02-09 NOTE — PROGRESS NOTES
"                                                         PROGRESS NOTE    Subjective:       Patient ID: Enma Navarro is a 56 y.o. female.    Breast cancer 2008  Bilateral mastectomy/tissue reconstruc    Chief Complaint:  No chief complaint on file.  breast cancer follow up.     History of Present Illness:   Enma Navarro is a 56 y.o. female who presents for routine yearly follow up .     No new complaints at this time. She is feeling well.      mammo negative 8/4/2021    Family and Social history reviewed and is unchanged from 7/29/2014.       ROS:  Review of Systems   Constitutional: Negative for appetite change, fever and unexpected weight change.   HENT: Negative for mouth sores.    Eyes: Negative for visual disturbance.   Respiratory: Negative for cough and shortness of breath.    Cardiovascular: Negative for chest pain and leg swelling.   Gastrointestinal: Negative for abdominal pain, blood in stool and diarrhea.   Genitourinary: Negative for frequency and hematuria.   Musculoskeletal: Negative for back pain.   Skin: Negative for rash.   Neurological: Negative for headaches.   Hematological: Negative for adenopathy.   Psychiatric/Behavioral: The patient is not nervous/anxious.           Current Outpatient Medications:     calcium carbonate (TUMS) 200 mg calcium (500 mg) chewable tablet, Take 1 tablet by mouth once daily. prn, Disp: , Rfl:     calcium-vitamin D3 (OS-LIANA 500 + D3) 500 mg-5 mcg (200 unit) per tablet, Take 1 tablet by mouth 2 (two) times daily with meals., Disp: , Rfl:     famotidine (PEPCID) 20 MG tablet, Take 20 mg by mouth daily as needed for Heartburn. prn, Disp: , Rfl:     fexofenadine (ALLEGRA) 180 MG tablet, Take 180 mg by mouth once daily. prn, Disp: , Rfl:     multivitamin (THERAGRAN) per tablet, Take 1 tablet by mouth once daily., Disp: , Rfl:         Objective:       Physical Examination:     BP (!) 158/87   Pulse 99   Temp 97.7 °F (36.5 °C)   Resp 18   Ht 5' 3" (1.6 m)  "  Wt 73.9 kg (163 lb)   BMI 28.87 kg/m²     Physical Exam  Constitutional:       Appearance: She is well-developed.   HENT:      Head: Normocephalic and atraumatic.      Right Ear: External ear normal.      Left Ear: External ear normal.   Eyes:      Conjunctiva/sclera: Conjunctivae normal.      Pupils: Pupils are equal, round, and reactive to light.   Neck:      Thyroid: No thyromegaly.      Trachea: No tracheal deviation.   Cardiovascular:      Rate and Rhythm: Normal rate and regular rhythm.      Heart sounds: Normal heart sounds.   Pulmonary:      Effort: Pulmonary effort is normal.      Breath sounds: Normal breath sounds.   Chest:       Abdominal:      General: Bowel sounds are normal. There is no distension.      Palpations: Abdomen is soft. There is no mass.      Tenderness: There is no abdominal tenderness.   Skin:     Findings: No rash.   Neurological:      Comments: Neuro intact througout   Psychiatric:         Behavior: Behavior normal.         Thought Content: Thought content normal.         Judgment: Judgment normal.         Labs:   No results found for this or any previous visit (from the past 336 hour(s)).  CMP  Sodium   Date Value Ref Range Status   09/13/2021 142 135 - 146 mmol/L Final     Potassium   Date Value Ref Range Status   09/13/2021 3.9 3.5 - 5.3 mmol/L Final     Chloride   Date Value Ref Range Status   09/13/2021 106 98 - 110 mmol/L Final     CO2   Date Value Ref Range Status   09/13/2021 26 20 - 32 mmol/L Final     Glucose   Date Value Ref Range Status   09/13/2021 78 65 - 99 mg/dL Final     Comment:                   Fasting reference interval          BUN   Date Value Ref Range Status   09/13/2021 13 7 - 25 mg/dL Final     Creatinine   Date Value Ref Range Status   09/13/2021 0.63 0.50 - 1.05 mg/dL Final     Comment:     For patients >49 years of age, the reference limit  for Creatinine is approximately 13% higher for people  identified as -American.          Calcium   Date  Value Ref Range Status   09/13/2021 9.1 8.6 - 10.4 mg/dL Final     Total Protein   Date Value Ref Range Status   09/13/2021 7.0 6.1 - 8.1 g/dL Final     Albumin   Date Value Ref Range Status   09/13/2021 4.3 3.6 - 5.1 g/dL Final     Total Bilirubin   Date Value Ref Range Status   09/13/2021 0.4 0.2 - 1.2 mg/dL Final     Alkaline Phosphatase   Date Value Ref Range Status   08/27/2018 69 33 - 130 U/L Final     AST   Date Value Ref Range Status   09/13/2021 45 (H) 10 - 35 U/L Final     ALT   Date Value Ref Range Status   09/13/2021 50 (H) 6 - 29 U/L Final     eGFR if    Date Value Ref Range Status   09/13/2021 118 > OR = 60 mL/min/1.73m2 Final     eGFR if non    Date Value Ref Range Status   09/13/2021 102 > OR = 60 mL/min/1.73m2 Final     No results found for: CEA  No results found for: PSA        Assessment/Plan:     Problem List Items Addressed This Visit     History of breast cancer-2008 ; s/p bilateral masectomies with reconstruction form abdominal fat (Chronic)     Patient is doing well at this time and appears FREDERICK.  Exam is negative and will continue to see her yearly at this point.           Relevant Orders    Comprehensive Metabolic Panel    Transaminitis     This was new in September and will recheck this to see if this remains or has worsened.  Will need work up if so.          Relevant Orders    Comprehensive Metabolic Panel          Discussion:     Follow up in about 1 year (around 2/9/2023).      Electronically signed by Delbert Roca

## 2022-02-09 NOTE — ASSESSMENT & PLAN NOTE
This was new in September and will recheck this to see if this remains or has worsened.  Will need work up if so.

## 2022-02-09 NOTE — ASSESSMENT & PLAN NOTE
Patient is doing well at this time and appears FREDERICK.  Exam is negative and will continue to see her yearly at this point.

## 2022-04-12 ENCOUNTER — TELEPHONE (OUTPATIENT)
Dept: CARDIOLOGY | Facility: HOSPITAL | Age: 57
End: 2022-04-12

## 2022-04-12 DIAGNOSIS — E78.5 DYSLIPIDEMIA: ICD-10-CM

## 2022-04-12 DIAGNOSIS — R06.02 SOB (SHORTNESS OF BREATH): Primary | ICD-10-CM

## 2022-04-13 ENCOUNTER — CLINICAL SUPPORT (OUTPATIENT)
Dept: CARDIOLOGY | Facility: HOSPITAL | Age: 57
End: 2022-04-13
Attending: INTERNAL MEDICINE
Payer: OTHER GOVERNMENT

## 2022-04-13 ENCOUNTER — TELEPHONE (OUTPATIENT)
Dept: CARDIOLOGY | Facility: CLINIC | Age: 57
End: 2022-04-13
Payer: OTHER GOVERNMENT

## 2022-04-13 ENCOUNTER — HOSPITAL ENCOUNTER (OUTPATIENT)
Dept: RADIOLOGY | Facility: HOSPITAL | Age: 57
Discharge: HOME | End: 2022-04-13
Attending: INTERNAL MEDICINE
Payer: OTHER GOVERNMENT

## 2022-04-13 ENCOUNTER — TELEPHONE (OUTPATIENT)
Dept: CARDIOLOGY | Facility: CLINIC | Age: 57
End: 2022-04-13

## 2022-04-13 VITALS — BODY MASS INDEX: 28.88 KG/M2 | HEIGHT: 63 IN | WEIGHT: 163 LBS

## 2022-04-13 DIAGNOSIS — E78.5 DYSLIPIDEMIA: ICD-10-CM

## 2022-04-13 DIAGNOSIS — R53.83 FATIGUE, UNSPECIFIED TYPE: ICD-10-CM

## 2022-04-13 DIAGNOSIS — R06.02 SOB (SHORTNESS OF BREATH): ICD-10-CM

## 2022-04-13 DIAGNOSIS — R06.02 SHORTNESS OF BREATH: ICD-10-CM

## 2022-04-13 LAB
BSA FOR ECHO PROCEDURE: 1.81 M2
CV STRESS BASE HR: 99 BPM
DIASTOLIC BLOOD PRESSURE: 96 MMHG
EJECTION FRACTION: 75 %
OHS CV CPX 1 MINUTE RECOVERY HEART RATE: 156 BPM
OHS CV CPX 85 PERCENT MAX PREDICTED HEART RATE MALE: 135
OHS CV CPX ESTIMATED METS: 7
OHS CV CPX MAX PREDICTED HEART RATE: 158
OHS CV CPX PATIENT IS FEMALE: 1
OHS CV CPX PATIENT IS MALE: 0
OHS CV CPX PEAK DIASTOLIC BLOOD PRESSURE: 96 MMHG
OHS CV CPX PEAK HEAR RATE: 167 BPM
OHS CV CPX PEAK RATE PRESSURE PRODUCT: NORMAL
OHS CV CPX PEAK SYSTOLIC BLOOD PRESSURE: 194 MMHG
OHS CV CPX PERCENT MAX PREDICTED HEART RATE ACHIEVED: 105
OHS CV CPX RATE PRESSURE PRODUCT PRESENTING: NORMAL
RA PRESSURE: 3 MMHG
STRESS ECHO POST EXERCISE DUR MIN: 6 MINUTES
SYSTOLIC BLOOD PRESSURE: 142 MMHG

## 2022-04-13 PROCEDURE — 93016 CV STRESS TEST SUPVJ ONLY: CPT | Mod: ,,, | Performed by: INTERNAL MEDICINE

## 2022-04-13 PROCEDURE — 93306 TTE W/DOPPLER COMPLETE: CPT | Mod: 26,,, | Performed by: INTERNAL MEDICINE

## 2022-04-13 PROCEDURE — 93306 TTE W/DOPPLER COMPLETE: CPT

## 2022-04-13 PROCEDURE — A9502 TC99M TETROFOSMIN: HCPCS

## 2022-04-13 PROCEDURE — 93306 ECHO (CUPID ONLY): ICD-10-PCS | Mod: 26,,, | Performed by: INTERNAL MEDICINE

## 2022-04-13 PROCEDURE — 93016 NUCLEAR STRESS TEST (CUPID ONLY): ICD-10-PCS | Mod: ,,, | Performed by: INTERNAL MEDICINE

## 2022-04-13 PROCEDURE — 93018 NUCLEAR STRESS TEST (CUPID ONLY): ICD-10-PCS | Mod: ,,, | Performed by: INTERNAL MEDICINE

## 2022-04-13 PROCEDURE — 93018 CV STRESS TEST I&R ONLY: CPT | Mod: ,,, | Performed by: INTERNAL MEDICINE

## 2022-04-13 PROCEDURE — 93017 CV STRESS TEST TRACING ONLY: CPT

## 2022-04-13 RX ORDER — BETAXOLOL 10 MG/1
10 TABLET, FILM COATED ORAL DAILY
Qty: 30 TABLET | Refills: 11 | Status: SHIPPED | OUTPATIENT
Start: 2022-04-13 | End: 2023-05-10

## 2022-04-13 NOTE — TELEPHONE ENCOUNTER
Spoke with pt let her know results below per Didi Mahajan. Pt verbalized understanding.     Stress test is negative for reversible ischemia.

## 2022-04-13 NOTE — TELEPHONE ENCOUNTER
----- Message from Carmen Denise sent at 4/13/2022  2:44 PM CDT -----  Who Called: Patient    What is the reqeust in detail: Requesting call back to schedule 3 week follow up per Dr. Villarreal's request to review stress test. Please advise.     Can the clinic reply by [facility]? Yes    Best Call Back Number: [phone]    Additional Information:

## 2022-04-13 NOTE — PROGRESS NOTES
During stress test HR and BP up   Dr. AZAEL Villarreal would like her to start taking kerlone 5 mg every night   Med sent in

## 2022-04-13 NOTE — TELEPHONE ENCOUNTER
----- Message from Barber Stringer sent at 4/13/2022  3:36 PM CDT -----  Contact: pt  Just got off phone with  nurse but realized she needs appt in 3 weeks to go over medications with VB please call back to get appt for pt     [phone]

## 2022-04-13 NOTE — TELEPHONE ENCOUNTER
----- Message from Diid Mahajan NP sent at 4/13/2022  2:26 PM CDT -----  Stress test is negative for reversible ischemia.

## 2022-05-20 LAB
ALBUMIN SERPL-MCNC: 4.5 G/DL (ref 3.6–5.1)
ALBUMIN/GLOB SERPL: 1.6 (CALC) (ref 1–2.5)
ALP SERPL-CCNC: 70 U/L (ref 37–153)
ALT SERPL-CCNC: 25 U/L (ref 6–29)
AST SERPL-CCNC: 25 U/L (ref 10–35)
BILIRUB SERPL-MCNC: 0.7 MG/DL (ref 0.2–1.2)
BUN SERPL-MCNC: 16 MG/DL (ref 7–25)
BUN/CREAT SERPL: NORMAL (CALC) (ref 6–22)
CALCIUM SERPL-MCNC: 9.8 MG/DL (ref 8.6–10.4)
CHLORIDE SERPL-SCNC: 103 MMOL/L (ref 98–110)
CHOLEST SERPL-MCNC: 276 MG/DL
CHOLEST/HDLC SERPL: 5.6 (CALC)
CO2 SERPL-SCNC: 29 MMOL/L (ref 20–32)
CREAT SERPL-MCNC: 0.73 MG/DL (ref 0.5–1.05)
GLOBULIN SER CALC-MCNC: 2.9 G/DL (CALC) (ref 1.9–3.7)
GLUCOSE SERPL-MCNC: 80 MG/DL (ref 65–99)
HDLC SERPL-MCNC: 49 MG/DL
LDLC SERPL CALC-MCNC: 198 MG/DL (CALC)
NONHDLC SERPL-MCNC: 227 MG/DL (CALC)
POTASSIUM SERPL-SCNC: 4.4 MMOL/L (ref 3.5–5.3)
PROT SERPL-MCNC: 7.4 G/DL (ref 6.1–8.1)
SODIUM SERPL-SCNC: 140 MMOL/L (ref 135–146)
TRIGL SERPL-MCNC: 138 MG/DL

## 2022-05-23 ENCOUNTER — OFFICE VISIT (OUTPATIENT)
Dept: CARDIOLOGY | Facility: CLINIC | Age: 57
End: 2022-05-23
Payer: OTHER GOVERNMENT

## 2022-05-23 VITALS
OXYGEN SATURATION: 98 % | HEART RATE: 82 BPM | WEIGHT: 167 LBS | SYSTOLIC BLOOD PRESSURE: 136 MMHG | BODY MASS INDEX: 29.59 KG/M2 | DIASTOLIC BLOOD PRESSURE: 80 MMHG | HEIGHT: 63 IN

## 2022-05-23 DIAGNOSIS — I10 HYPERTENSION, UNSPECIFIED TYPE: ICD-10-CM

## 2022-05-23 DIAGNOSIS — E78.5 DYSLIPIDEMIA: Primary | ICD-10-CM

## 2022-05-23 PROCEDURE — 99214 PR OFFICE/OUTPT VISIT, EST, LEVL IV, 30-39 MIN: ICD-10-PCS | Mod: S$GLB,,, | Performed by: NURSE PRACTITIONER

## 2022-05-23 PROCEDURE — 99214 OFFICE O/P EST MOD 30 MIN: CPT | Mod: S$GLB,,, | Performed by: NURSE PRACTITIONER

## 2022-05-23 NOTE — ASSESSMENT & PLAN NOTE
I have explained the dangers of her high LDL  She would like to try diet and natural supplements at this time I have explained we will recheck this in 3 months if still elevated she must go on cholesterol medication

## 2022-05-23 NOTE — PROGRESS NOTES
Subjective:    Patient ID:  Enam Navarro is a 57 y.o. female patient here for evaluation Follow-up (6 weeks . Blood pressure medication and Labs )      History of Present Illness:     Patient is here for follow-up visit  Denies chest pain or shortness of breath.  Denies recent fever cough chills or congestion.  Denies blood in the urine or blood in the stool.  Denies myalgias  Denies orthopnea or peripheral edema  Denies nausea vomiting or dyspepsia  No recent arm neck or jaw pain.    No lightheadedness or dizziness.  Recent stress and ECHO WNL.  Recent labs reviewed. Severe dyslipidemia noted. She would like to try diet and natural supplements at this time.        Review of patient's allergies indicates:   Allergen Reactions    Penicillins      Reaction as a baby, rash.        Past Medical History:   Diagnosis Date    Allergy     Angio-edema     Breast cancer     Cancer     breast cancer     Past Surgical History:   Procedure Laterality Date    BREAST BIOPSY Right     BREAST SURGERY      double mastectomy w/reconstruction     SECTION      2 c-sections    COLONOSCOPY N/A 2018    Procedure: COLONOSCOPY;  Surgeon: Ellis Gutierres MD;  Location: [facility];  Service: Endoscopy;  Laterality: N/A;    ESOPHAGOGASTRODUODENOSCOPY N/A 10/11/2018    Procedure: EGD (ESOPHAGOGASTRODUODENOSCOPY);  Surgeon: Ellis Gutierres MD;  Location: [facility];  Service: Endoscopy;  Laterality: N/A;    FINGER SURGERY      left index finger    HYSTERECTOMY      MASTECTOMY      TONSILLECTOMY       Social History     Tobacco Use    Smoking status: Never Smoker    Smokeless tobacco: Never Used   Substance Use Topics    Alcohol use: No    Drug use: No        Review of Systems:    As noted in HPI in addition      REVIEW OF SYSTEMS  CARDIOVASCULAR: No recent chest pain, palpitations, arm, neck, or jaw pain  RESPIRATORY: No recent fever, cough chills, SOB or congestion  : No blood in the urine  GI: No  Nausea, vomiting, constipation, diarrhea, blood, or reflux.  MUSCULOSKELETAL: No myalgias  NEURO: No lightheadedness or dizziness  EYES: No Double vision, blurry, vision or headache              Objective        Vitals:    05/23/22 1404   BP: 136/80   Pulse: 82       LIPIDS - LAST 2   Lab Results   Component Value Date    CHOL 276 (H) 05/19/2022    CHOL 212 (H) 09/13/2021    HDL 49 (L) 05/19/2022    HDL 47 (L) 09/13/2021    LDLCALC 198 (H) 05/19/2022    LDLCALC 135 (H) 09/13/2021    TRIG 138 05/19/2022    TRIG 160 (H) 09/13/2021    CHOLHDL 5.6 (H) 05/19/2022    CHOLHDL 4.5 09/13/2021       CBC - LAST 2  Lab Results   Component Value Date    WBC 3.9 09/13/2021    WBC 3.7 (L) 08/27/2018    RBC 4.64 09/13/2021    RBC 4.72 08/27/2018    HGB 14.1 09/13/2021    HGB 14.2 08/27/2018    HCT 40.4 09/13/2021    HCT 41.1 08/27/2018    MCV 87.1 09/13/2021    MCV 87.1 08/27/2018    MCH 30.4 09/13/2021    MCH 30.1 08/27/2018    MCHC 34.9 09/13/2021    MCHC 34.5 08/27/2018    RDW 12.5 09/13/2021    RDW 12.8 08/27/2018     09/13/2021     08/27/2018    MPV 10.4 09/13/2021    MPV 10.5 08/27/2018    LYMPH 1,092 09/13/2021    LYMPH 28.0 09/13/2021    MONO 413 09/13/2021    MONO 10.6 09/13/2021    BASO 31 09/13/2021    BASO 30 08/27/2018       CHEMISTRY & LIVER FUNCTION - LAST 2  Lab Results   Component Value Date     05/19/2022     09/13/2021    K 4.4 05/19/2022    K 3.9 09/13/2021     05/19/2022     09/13/2021    CO2 29 05/19/2022    CO2 26 09/13/2021    BUN 16 05/19/2022    BUN 13 09/13/2021    CREATININE 0.73 05/19/2022    CREATININE 0.63 09/13/2021    GLU 80 05/19/2022    GLU 78 09/13/2021    CALCIUM 9.8 05/19/2022    CALCIUM 9.1 09/13/2021    ALBUMIN 4.5 05/19/2022    ALBUMIN 4.3 09/13/2021    PROT 7.4 05/19/2022    PROT 7.0 09/13/2021    ALKPHOS 69 08/27/2018    ALT 25 05/19/2022    ALT 50 (H) 09/13/2021    AST 25 05/19/2022    AST 45 (H) 09/13/2021    BILITOT 0.7 05/19/2022    BILITOT 0.4  09/13/2021        CARDIAC PROFILE - LAST 2  No results found for: BNP, CPK, CPKMB, LDH, TROPONINI     COAGULATION - LAST 2  No results found for: LABPT, INR, APTT    ENDOCRINE & PSA - LAST 2  Lab Results   Component Value Date    TSH 1.37 09/13/2021    TSH 1.24 08/27/2018        ECHOCARDIOGRAM RESULTS  Results for orders placed in visit on 04/13/22    Echo    Interpretation Summary  · The left ventricle is normal in size with mild concentric hypertrophy and hyperdynamic systolic function.  · The estimated ejection fraction is 75%.  · Normal right ventricular size with normal right ventricular systolic function.  · Normal central venous pressure (3 mmHg).      CURRENT/PREVIOUS VISIT EKG  Results for orders placed or performed in visit on 01/25/22   IN OFFICE EKG 12-LEAD (to [employer])    Collection Time: 01/25/22  4:20 PM    Narrative    Test Reason : E78.5,R06.02,R53.83,    Vent. Rate : 093 BPM     Atrial Rate : 093 BPM     P-R Int : 142 ms          QRS Dur : 088 ms      QT Int : 354 ms       P-R-T Axes : 060 -23 057 degrees     QTc Int : 440 ms    Normal sinus rhythm  Minimal voltage criteria for LVH, may be normal variant  Borderline Abnormal ECG  No previous ECGs available  Confirmed by Javon Villarreal MD ([id number]) on 2/7/2022 9:19:54 PM    Referred By:  RENETTA           Confirmed By:Javon Villarreal MD     No valid procedures specified.   Results for orders placed in visit on 04/13/22    Nuclear Stress Test    Interpretation Summary    The nuclear portion of this study will be reported separately.    The EKG portion of this study is negative for ischemia.    The patient reported no chest pain during the stress test.    There were no arrhythmias during stress.        PHYSICAL EXAM  CONSTITUTIONAL: Well built, well nourished in no apparent distress  NECK: no carotid bruit, no JVD  LUNGS: CTA  CHEST WALL: no tenderness  HEART: regular rate and rhythm, S1, S2 normal, no murmur, click, rub or gallop   ABDOMEN: soft,  non-tender; bowel sounds normal; no masses,  no organomegaly  EXTREMITIES: Extremities normal, no edema, no calf tenderness noted  NEURO: AAO X 3    I HAVE REVIEWED :    The vital signs, nurses notes, and all the pertinent radiology and labs.        Current Outpatient Medications   Medication Instructions    betaxoloL (KERLONE) 10 mg, Oral, Daily    calcium carbonate (TUMS) 200 mg calcium (500 mg) chewable tablet 1 tablet, Oral, Daily, prn    calcium-vitamin D3 (OS-LIANA 500 + D3) 500 mg-5 mcg (200 unit) per tablet 1 tablet, Oral, 2 times daily with meals    famotidine (PEPCID) 20 mg, Oral, Daily PRN, prn    fexofenadine (ALLEGRA) 180 mg, Oral, Daily, prn     multivitamin (THERAGRAN) per tablet 1 tablet, Oral, Daily          Assessment & Plan     Dyslipidemia  I have explained the dangers of her high LDL  She would like to try diet and natural supplements at this time I have explained we will recheck this in 3 months if still elevated she must go on cholesterol medication    Hypertension  BP stable on current regimen  Continue Betaxolol daily  Check bp at least 2 times a week and let me know if bp is elevated  Follow low salt diet           No follow-ups on file.

## 2022-05-23 NOTE — ASSESSMENT & PLAN NOTE
BP stable on current regimen  Continue Betaxolol daily  Check bp at least 2 times a week and let me know if bp is elevated  Follow low salt diet

## 2022-08-30 ENCOUNTER — TELEPHONE (OUTPATIENT)
Dept: HEMATOLOGY/ONCOLOGY | Facility: CLINIC | Age: 57
End: 2022-08-30

## 2022-08-30 DIAGNOSIS — Z85.3 HISTORY OF BREAST CANCER: ICD-10-CM

## 2022-08-30 DIAGNOSIS — Z12.31 SCREENING MAMMOGRAM FOR HIGH-RISK PATIENT: Primary | ICD-10-CM

## 2022-09-01 DIAGNOSIS — M85.88 OTHER SPECIFIED DISORDERS OF BONE DENSITY AND STRUCTURE, OTHER SITE: Primary | ICD-10-CM

## 2022-09-15 ENCOUNTER — HOSPITAL ENCOUNTER (OUTPATIENT)
Dept: RADIOLOGY | Facility: HOSPITAL | Age: 57
Discharge: HOME | End: 2022-09-15
Attending: INTERNAL MEDICINE
Payer: OTHER GOVERNMENT

## 2022-09-15 ENCOUNTER — HOSPITAL ENCOUNTER (OUTPATIENT)
Dept: RADIOLOGY | Facility: HOSPITAL | Age: 57
Discharge: HOME | End: 2022-09-15
Attending: OBSTETRICS & GYNECOLOGY
Payer: OTHER GOVERNMENT

## 2022-09-15 DIAGNOSIS — M85.88 OTHER SPECIFIED DISORDERS OF BONE DENSITY AND STRUCTURE, OTHER SITE: ICD-10-CM

## 2022-09-15 DIAGNOSIS — Z85.3 HISTORY OF BREAST CANCER: ICD-10-CM

## 2022-09-15 DIAGNOSIS — Z12.31 SCREENING MAMMOGRAM FOR HIGH-RISK PATIENT: ICD-10-CM

## 2022-09-15 PROCEDURE — 77080 DXA BONE DENSITY AXIAL: CPT | Mod: TC,PO

## 2022-09-15 PROCEDURE — 77063 BREAST TOMOSYNTHESIS BI: CPT | Mod: TC,PO

## 2022-09-15 PROCEDURE — 77067 SCR MAMMO BI INCL CAD: CPT | Mod: TC,PO

## 2023-03-13 ENCOUNTER — OFFICE VISIT (OUTPATIENT)
Dept: HEMATOLOGY/ONCOLOGY | Facility: CLINIC | Age: 58
End: 2023-03-13
Payer: OTHER GOVERNMENT

## 2023-03-13 VITALS
WEIGHT: 165 LBS | HEART RATE: 64 BPM | SYSTOLIC BLOOD PRESSURE: 142 MMHG | HEIGHT: 63 IN | OXYGEN SATURATION: 99 % | TEMPERATURE: 98 F | DIASTOLIC BLOOD PRESSURE: 69 MMHG | RESPIRATION RATE: 18 BRPM | BODY MASS INDEX: 29.23 KG/M2

## 2023-03-13 DIAGNOSIS — Z12.31 ENCOUNTER FOR SCREENING MAMMOGRAM FOR HIGH-RISK PATIENT: Primary | ICD-10-CM

## 2023-03-13 DIAGNOSIS — Z85.3 HISTORY OF BREAST CANCER: Chronic | ICD-10-CM

## 2023-03-13 PROCEDURE — 99213 OFFICE O/P EST LOW 20 MIN: CPT | Mod: S$GLB,,, | Performed by: INTERNAL MEDICINE

## 2023-03-13 PROCEDURE — 99213 PR OFFICE/OUTPT VISIT, EST, LEVL III, 20-29 MIN: ICD-10-PCS | Mod: S$GLB,,, | Performed by: INTERNAL MEDICINE

## 2023-03-13 NOTE — ASSESSMENT & PLAN NOTE
Patient is doing well.  Exam is negative and she appears FREDERICK at this time.  Will continue yearly mammogram and this will be due in September this year.  Will continue yearly follow up.      Note is made of normalized LFTs in may of 2022.

## 2023-03-13 NOTE — PROGRESS NOTES
PROGRESS NOTE    Subjective:       Patient ID: Enma Navarro is a 57 y.o. female.    Breast cancer 2008  Bilateral mastectomy/tissue reconstruc    Chief Complaint:  No chief complaint on file.  breast cancer follow up.     History of Present Illness:   Enma Navarro is a 57 y.o. female who presents for routine yearly follow up .     No new complaints at this time. She is feeling well.      mammo negative 8/4/2021    Family and Social history reviewed and is unchanged from 7/29/2014.       ROS:  Review of Systems   Constitutional:  Negative for appetite change, fever and unexpected weight change.   HENT:  Negative for mouth sores.    Eyes:  Negative for visual disturbance.   Respiratory:  Negative for cough and shortness of breath.    Cardiovascular:  Negative for chest pain and leg swelling.   Gastrointestinal:  Negative for abdominal pain, blood in stool and diarrhea.   Genitourinary:  Negative for frequency and hematuria.   Musculoskeletal:  Negative for back pain.   Skin:  Negative for rash.   Neurological:  Negative for headaches.   Hematological:  Negative for adenopathy.   Psychiatric/Behavioral:  The patient is not nervous/anxious.         Current Outpatient Medications:     betaxoloL (KERLONE) 10 mg Tab, Take 1 tablet (10 mg total) by mouth once daily., Disp: 30 tablet, Rfl: 11    calcium carbonate (TUMS) 200 mg calcium (500 mg) chewable tablet, Take 1 tablet by mouth once daily. prn, Disp: , Rfl:     calcium-vitamin D3 (OS-LIANA 500 + D3) 500 mg-5 mcg (200 unit) per tablet, Take 1 tablet by mouth 2 (two) times daily with meals., Disp: , Rfl:     famotidine (PEPCID) 20 MG tablet, Take 20 mg by mouth daily as needed for Heartburn. prn, Disp: , Rfl:     fexofenadine (ALLEGRA) 180 MG tablet, Take 180 mg by mouth once daily. prn, Disp: , Rfl:     multivitamin (THERAGRAN) per tablet, Take 1 tablet by mouth once daily., Disp: , Rfl:         Objective:  "      Physical Examination:     BP (!) 142/69   Pulse 64   Temp 97.9 °F (36.6 °C)   Resp 18   Ht 5' 3" (1.6 m)   Wt 74.8 kg (165 lb)   SpO2 99%   BMI 29.23 kg/m²     Physical Exam  Constitutional:       Appearance: She is well-developed.   HENT:      Head: Normocephalic and atraumatic.      Right Ear: External ear normal.      Left Ear: External ear normal.   Eyes:      Conjunctiva/sclera: Conjunctivae normal.      Pupils: Pupils are equal, round, and reactive to light.   Neck:      Thyroid: No thyromegaly.      Trachea: No tracheal deviation.   Cardiovascular:      Rate and Rhythm: Normal rate and regular rhythm.      Heart sounds: Normal heart sounds.   Pulmonary:      Effort: Pulmonary effort is normal.      Breath sounds: Normal breath sounds.   Chest:       Abdominal:      General: Bowel sounds are normal. There is no distension.      Palpations: Abdomen is soft. There is no mass.      Tenderness: There is no abdominal tenderness.   Skin:     Findings: No rash.   Neurological:      Comments: Neuro intact througout   Psychiatric:         Behavior: Behavior normal.         Thought Content: Thought content normal.         Judgment: Judgment normal.       Labs:   No results found for this or any previous visit (from the past 336 hour(s)).  CMP  Sodium   Date Value Ref Range Status   05/19/2022 140 135 - 146 mmol/L Final     Potassium   Date Value Ref Range Status   05/19/2022 4.4 3.5 - 5.3 mmol/L Final     Chloride   Date Value Ref Range Status   05/19/2022 103 98 - 110 mmol/L Final     CO2   Date Value Ref Range Status   05/19/2022 29 20 - 32 mmol/L Final     Glucose   Date Value Ref Range Status   05/19/2022 80 65 - 99 mg/dL Final     Comment:                   Fasting reference interval          BUN   Date Value Ref Range Status   05/19/2022 16 7 - 25 mg/dL Final     Creatinine   Date Value Ref Range Status   05/19/2022 0.73 0.50 - 1.05 mg/dL Final     Comment:     For patients >49 years of age, the " reference limit  for Creatinine is approximately 13% higher for people  identified as -American.          Calcium   Date Value Ref Range Status   05/19/2022 9.8 8.6 - 10.4 mg/dL Final     Total Protein   Date Value Ref Range Status   05/19/2022 7.4 6.1 - 8.1 g/dL Final     Albumin   Date Value Ref Range Status   05/19/2022 4.5 3.6 - 5.1 g/dL Final     Total Bilirubin   Date Value Ref Range Status   05/19/2022 0.7 0.2 - 1.2 mg/dL Final     Alkaline Phosphatase   Date Value Ref Range Status   08/27/2018 69 33 - 130 U/L Final     AST   Date Value Ref Range Status   05/19/2022 25 10 - 35 U/L Final     ALT   Date Value Ref Range Status   05/19/2022 25 6 - 29 U/L Final     eGFR if    Date Value Ref Range Status   05/19/2022 107 > OR = 60 mL/min/1.73m2 Final     eGFR if non    Date Value Ref Range Status   05/19/2022 93 > OR = 60 mL/min/1.73m2 Final     No results found for: CEA  No results found for: PSA        Assessment/Plan:     Problem List Items Addressed This Visit       History of breast cancer-2008 ; s/p bilateral masectomies with reconstruction form abdominal fat (Chronic)     Patient is doing well.  Exam is negative and she appears FREDERICK at this time.  Will continue yearly mammogram and this will be due in September this year.  Will continue yearly follow up.      Note is made of normalized LFTs in may of 2022.            Other Visit Diagnoses       Encounter for screening mammogram for high-risk patient    -  Primary    Relevant Orders    Mammo Digital Screening Bilat w/ Felix          Discussion:     Follow up in about 1 year (around 3/13/2024).      Electronically signed by Delbert Roca

## 2023-05-10 RX ORDER — BETAXOLOL 10 MG/1
TABLET, FILM COATED ORAL
Qty: 30 TABLET | Refills: 2 | Status: SHIPPED | OUTPATIENT
Start: 2023-05-10 | End: 2023-12-01 | Stop reason: SDUPTHER

## 2023-07-15 LAB
ALBUMIN SERPL-MCNC: 4.5 G/DL (ref 3.6–5.1)
ALBUMIN/GLOB SERPL: 1.7 (CALC) (ref 1–2.5)
ALP SERPL-CCNC: 69 U/L (ref 37–153)
ALT SERPL-CCNC: 22 U/L (ref 6–29)
AST SERPL-CCNC: 22 U/L (ref 10–35)
BILIRUB SERPL-MCNC: 0.6 MG/DL (ref 0.2–1.2)
BUN SERPL-MCNC: 11 MG/DL (ref 7–25)
BUN/CREAT SERPL: NORMAL (CALC) (ref 6–22)
CALCIUM SERPL-MCNC: 9.5 MG/DL (ref 8.6–10.4)
CHLORIDE SERPL-SCNC: 104 MMOL/L (ref 98–110)
CHOLEST SERPL-MCNC: 244 MG/DL
CHOLEST/HDLC SERPL: 5.5 (CALC)
CO2 SERPL-SCNC: 28 MMOL/L (ref 20–32)
CREAT SERPL-MCNC: 0.6 MG/DL (ref 0.5–1.03)
EGFR: 105 ML/MIN/1.73M2
GLOBULIN SER CALC-MCNC: 2.7 G/DL (CALC) (ref 1.9–3.7)
GLUCOSE SERPL-MCNC: 81 MG/DL (ref 65–99)
HDLC SERPL-MCNC: 44 MG/DL
LDLC SERPL CALC-MCNC: 167 MG/DL (CALC)
NONHDLC SERPL-MCNC: 200 MG/DL (CALC)
POTASSIUM SERPL-SCNC: 4.2 MMOL/L (ref 3.5–5.3)
PROT SERPL-MCNC: 7.2 G/DL (ref 6.1–8.1)
SODIUM SERPL-SCNC: 141 MMOL/L (ref 135–146)
TRIGL SERPL-MCNC: 177 MG/DL

## 2023-07-25 RX ORDER — ROSUVASTATIN CALCIUM 20 MG/1
10 TABLET, COATED ORAL DAILY
Qty: 45 TABLET | Refills: 3 | Status: SHIPPED | OUTPATIENT
Start: 2023-07-25 | End: 2023-12-01

## 2023-10-04 ENCOUNTER — HOSPITAL ENCOUNTER (OUTPATIENT)
Dept: RADIOLOGY | Facility: HOSPITAL | Age: 58
Discharge: HOME | End: 2023-10-04
Attending: INTERNAL MEDICINE
Payer: OTHER GOVERNMENT

## 2023-10-04 VITALS — HEIGHT: 63 IN | WEIGHT: 164.88 LBS | BODY MASS INDEX: 29.21 KG/M2

## 2023-10-04 DIAGNOSIS — Z12.31 ENCOUNTER FOR SCREENING MAMMOGRAM FOR HIGH-RISK PATIENT: ICD-10-CM

## 2023-10-04 PROCEDURE — 77067 SCR MAMMO BI INCL CAD: CPT | Mod: TC,PO

## 2023-11-04 ENCOUNTER — OFFICE VISIT (OUTPATIENT)
Dept: URGENT CARE | Facility: CLINIC | Age: 58
End: 2023-11-04
Payer: OTHER GOVERNMENT

## 2023-11-04 VITALS
HEART RATE: 73 BPM | WEIGHT: 160 LBS | DIASTOLIC BLOOD PRESSURE: 88 MMHG | OXYGEN SATURATION: 98 % | BODY MASS INDEX: 28.35 KG/M2 | SYSTOLIC BLOOD PRESSURE: 148 MMHG | RESPIRATION RATE: 18 BRPM | HEIGHT: 63 IN | TEMPERATURE: 98 F

## 2023-11-04 DIAGNOSIS — J06.9 UPPER RESPIRATORY TRACT INFECTION, UNSPECIFIED TYPE: ICD-10-CM

## 2023-11-04 DIAGNOSIS — R06.2 WHEEZING: ICD-10-CM

## 2023-11-04 DIAGNOSIS — R09.81 CONGESTION OF NASAL SINUS: Primary | ICD-10-CM

## 2023-11-04 LAB
CTP QC/QA: YES
SARS-COV-2 AG RESP QL IA.RAPID: NEGATIVE

## 2023-11-04 PROCEDURE — 99204 OFFICE O/P NEW MOD 45 MIN: CPT | Mod: 25,S$GLB,, | Performed by: NURSE PRACTITIONER

## 2023-11-04 PROCEDURE — 94640 AIRWAY INHALATION TREATMENT: CPT | Mod: S$GLB,,, | Performed by: NURSE PRACTITIONER

## 2023-11-04 PROCEDURE — 87811 SARS-COV-2 COVID19 W/OPTIC: CPT | Mod: QW,S$GLB,, | Performed by: NURSE PRACTITIONER

## 2023-11-04 PROCEDURE — 99204 PR OFFICE/OUTPT VISIT, NEW, LEVL IV, 45-59 MIN: ICD-10-PCS | Mod: 25,S$GLB,, | Performed by: NURSE PRACTITIONER

## 2023-11-04 PROCEDURE — 94640 PR INHAL RX, AIRWAY OBST/DX SPUTUM INDUCT: ICD-10-PCS | Mod: S$GLB,,, | Performed by: NURSE PRACTITIONER

## 2023-11-04 PROCEDURE — 87811 SARS CORONAVIRUS 2 ANTIGEN POCT, MANUAL READ: ICD-10-PCS | Mod: QW,S$GLB,, | Performed by: NURSE PRACTITIONER

## 2023-11-04 RX ORDER — AZITHROMYCIN 250 MG/1
TABLET, FILM COATED ORAL
Qty: 6 TABLET | Refills: 0 | Status: SHIPPED | OUTPATIENT
Start: 2023-11-04 | End: 2023-12-01

## 2023-11-04 RX ORDER — ALBUTEROL SULFATE 0.83 MG/ML
2.5 SOLUTION RESPIRATORY (INHALATION)
Status: COMPLETED | OUTPATIENT
Start: 2023-11-04 | End: 2023-11-04

## 2023-11-04 RX ORDER — PREDNISONE 20 MG/1
20 TABLET ORAL DAILY
Qty: 5 TABLET | Refills: 0 | Status: SHIPPED | OUTPATIENT
Start: 2023-11-04 | End: 2023-11-09

## 2023-11-04 RX ORDER — ALBUTEROL SULFATE 90 UG/1
1 AEROSOL, METERED RESPIRATORY (INHALATION) EVERY 4 HOURS PRN
Qty: 8.5 G | Refills: 0 | Status: SHIPPED | OUTPATIENT
Start: 2023-11-04 | End: 2023-12-01

## 2023-11-04 RX ORDER — IPRATROPIUM BROMIDE 0.5 MG/2.5ML
0.5 SOLUTION RESPIRATORY (INHALATION)
Status: COMPLETED | OUTPATIENT
Start: 2023-11-04 | End: 2023-11-04

## 2023-11-04 RX ORDER — BENZONATATE 200 MG/1
200 CAPSULE ORAL 3 TIMES DAILY PRN
Qty: 10 CAPSULE | Refills: 0 | Status: SHIPPED | OUTPATIENT
Start: 2023-11-04 | End: 2023-12-01

## 2023-11-04 RX ADMIN — IPRATROPIUM BROMIDE 0.5 MG: 0.5 SOLUTION RESPIRATORY (INHALATION) at 09:11

## 2023-11-04 RX ADMIN — ALBUTEROL SULFATE 2.5 MG: 0.83 SOLUTION RESPIRATORY (INHALATION) at 09:11

## 2023-11-04 NOTE — PROGRESS NOTES
"CHIEF COMPLAINT  Chief Complaint   Patient presents with    Nasal Congestion       HPI  Enma Muñoz a 58 y.o. female who presents with c/o nasal congestion, sneezing, cough for 4 days. Pt reports she was in a car for a few hours last Saturday with someone that tested positive for covid on Tuesday. Pt denies fever, rash, abdominal pain, n/v/d, chest pain or SOB. Pt reports today she feels like "its in my chest" Pt reports she has been taking mucinex with minimal relief of symptoms.       CURRENT MEDICATIONS  Current Outpatient Medications on File Prior to Visit   Medication Sig Dispense Refill    betaxoloL (KERLONE) 10 mg Tab TAKE 1 TABLET BY MOUTH EVERY DAY. START WITH 1/2 TABLET DAILY AS DIRECTED BY PHYSICIAN 30 tablet 2    calcium carbonate (TUMS) 200 mg calcium (500 mg) chewable tablet Take 1 tablet by mouth once daily. prn      calcium-vitamin D3 (OS-LIANA 500 + D3) 500 mg-5 mcg (200 unit) per tablet Take 1 tablet by mouth 2 (two) times daily with meals.      famotidine (PEPCID) 20 MG tablet Take 20 mg by mouth daily as needed for Heartburn. prn      fexofenadine (ALLEGRA) 180 MG tablet Take 180 mg by mouth once daily. prn      multivitamin (THERAGRAN) per tablet Take 1 tablet by mouth once daily.      rosuvastatin (CRESTOR) 20 MG tablet Take 0.5 tablets (10 mg total) by mouth once daily. 45 tablet 3     No current facility-administered medications on file prior to visit.       ALLERGIES  Review of patient's allergies indicates:   Allergen Reactions    Penicillins      Reaction as a baby, rash.        Immunization History   Administered Date(s) Administered    COVID-19, MRNA, LN-S, PF (MODERNA FULL 0.5 ML DOSE) 08/13/2021, 09/10/2021       PAST MEDICAL HISTORY  Past Medical History:   Diagnosis Date    Allergy     Angio-edema     Breast cancer 2008    Cancer     breast cancer       SURGICAL HISTORY  Past Surgical History:   Procedure Laterality Date    BREAST BIOPSY Right     BREAST SURGERY      double " mastectomy w/reconstruction     SECTION      2 c-sections    COLONOSCOPY N/A 2018    Procedure: COLONOSCOPY;  Surgeon: Ellis Gutierres MD;  Location: [facility] ENDO;  Service: Endoscopy;  Laterality: N/A;    ESOPHAGOGASTRODUODENOSCOPY N/A 10/11/2018    Procedure: EGD (ESOPHAGOGASTRODUODENOSCOPY);  Surgeon: Ellis Gutierres MD;  Location: [facility] ENDO;  Service: Endoscopy;  Laterality: N/A;    FINGER SURGERY      left index finger    HYSTERECTOMY      MASTECTOMY      TONSILLECTOMY         SOCIAL HISTORY  Social History     Socioeconomic History    Marital status:    Tobacco Use    Smoking status: Never    Smokeless tobacco: Never   Substance and Sexual Activity    Alcohol use: No    Drug use: No    Sexual activity: Yes       FAMILY HISTORY  Family History   Problem Relation Age of Onset    Stroke Mother     Depression Mother     Hypertension Father     Breast cancer Paternal Aunt        REVIEW OF SYSTEMS  Constitutional: No fever, chills, or weakness.  Eyes: No redness, pain, or discharge  HENT: No ear pain, no headache, + sinus congestion, no throat pain + sneezing  Respiratory: + cough,no wheezing or shortness of breath  Cardiovascular: No chest pain, palpitations or edema    All systems otherwise negative except as noted in the Review of Systems and History of Present Illness      PHYSICAL EXAM  Reviewed Triage Note  VITAL SIGNS: 148/88  Constitutional: Well developed, well nourished, Alert and oriented x3, No acute distress, non-toxic appearance.  HENT: Normocephalic, Atraumatic, Bilateral external ears normal, external nose negative, oropharynx moist, No oral exudates.  Eyes: PERRL, EOMI, Conjunctiva normal, No discharge.  Neck: Normal range of motion, no tenderness, supple, no carotid bruits  Respiratory: No respiratory distress, +slight wheezing to left upper lobe, no rhonchi, no rales - wheezing resolved with treatment in clinicaq  Cardiovascular: HR 73, normal rhythm, no murmurs, no rubs, no  dionisio.      LABS  Pertinent labs reviewed. (see chart for details)  Covid negative    ED COURSE & MEDICAL DECISION MAKING    Physical exam findings and lab results discussed with patient. No acute emergent medical condition identified at this time to warrant further testing. Will dispo home with instructions to follow up with PCP tomorrow. Duoneb treatment given in clinic. Script for prednisone, zpack, tessalon and inhaler sent to pharmacy of choice with instructions on usage. Pt agrees with plan of care.     DISPOSITION  Patient discharged in stable condition     CLINICAL IMPRESSION:  The primary encounter diagnosis was Congestion of nasal sinus. Diagnoses of Wheezing and Upper respiratory tract infection, unspecified type were also pertinent to this visit.    Patient advised to follow-up with your PCP within 3 days for BP re-check if Blood Pressure was >120/80 without history of hypertension.

## 2023-12-01 ENCOUNTER — OFFICE VISIT (OUTPATIENT)
Dept: CARDIOLOGY | Facility: CLINIC | Age: 58
End: 2023-12-01
Payer: OTHER GOVERNMENT

## 2023-12-01 VITALS
WEIGHT: 169 LBS | DIASTOLIC BLOOD PRESSURE: 80 MMHG | HEIGHT: 63 IN | BODY MASS INDEX: 29.95 KG/M2 | SYSTOLIC BLOOD PRESSURE: 130 MMHG

## 2023-12-01 DIAGNOSIS — E78.5 DYSLIPIDEMIA: ICD-10-CM

## 2023-12-01 DIAGNOSIS — I10 HYPERTENSION, UNSPECIFIED TYPE: Primary | ICD-10-CM

## 2023-12-01 DIAGNOSIS — J30.2 SEASONAL ALLERGIES: ICD-10-CM

## 2023-12-01 PROCEDURE — 99999 PR PBB SHADOW E&M-EST. PATIENT-LVL III: CPT | Mod: PBBFAC,,, | Performed by: NURSE PRACTITIONER

## 2023-12-01 PROCEDURE — 93005 ELECTROCARDIOGRAM TRACING: CPT | Mod: S$GLB,,, | Performed by: NURSE PRACTITIONER

## 2023-12-01 PROCEDURE — 93010 EKG 12-LEAD: ICD-10-PCS | Mod: S$GLB,,, | Performed by: GENERAL PRACTICE

## 2023-12-01 PROCEDURE — 93010 ELECTROCARDIOGRAM REPORT: CPT | Mod: S$GLB,,, | Performed by: GENERAL PRACTICE

## 2023-12-01 PROCEDURE — 93005 EKG 12-LEAD: ICD-10-PCS | Mod: S$GLB,,, | Performed by: NURSE PRACTITIONER

## 2023-12-01 PROCEDURE — 99214 OFFICE O/P EST MOD 30 MIN: CPT | Mod: S$GLB,,, | Performed by: NURSE PRACTITIONER

## 2023-12-01 PROCEDURE — 99214 PR OFFICE/OUTPT VISIT, EST, LEVL IV, 30-39 MIN: ICD-10-PCS | Mod: S$GLB,,, | Performed by: NURSE PRACTITIONER

## 2023-12-01 PROCEDURE — 99999 PR PBB SHADOW E&M-EST. PATIENT-LVL III: ICD-10-PCS | Mod: PBBFAC,,, | Performed by: NURSE PRACTITIONER

## 2023-12-01 RX ORDER — BETAXOLOL 10 MG/1
TABLET, FILM COATED ORAL
Qty: 45 TABLET | Refills: 3 | Status: CANCELLED | OUTPATIENT
Start: 2023-12-01

## 2023-12-01 RX ORDER — BETAXOLOL 10 MG/1
10 TABLET, FILM COATED ORAL DAILY
Qty: 45 TABLET | Refills: 3 | Status: SHIPPED | OUTPATIENT
Start: 2023-12-01

## 2023-12-01 NOTE — ASSESSMENT & PLAN NOTE
Last     Total 255  HDL 44  Never started crestor pharmacy issues  Will continue diet and exercise  Recheck in February  Start Fish oil and Red Yeast Rice.

## 2023-12-01 NOTE — PROGRESS NOTES
Subjective:    Patient ID:  Enma Navarro is a 58 y.o. female patient here for evaluation Follow-up      History of Present Illness:         Enma Navarro is here for follow-up visit. Denies chest pain or shortness of breath. Denies recent fever cough chills or congestion. Denies blood in the urine or blood in the stool.  Denies myalgias. Denies orthopnea or peripheral edema. Denies nausea vomiting or dyspepsia. No recent arm neck or jaw pain. No lightheadedness or dizziness.       Review of patient's allergies indicates:   Allergen Reactions    Penicillins      Reaction as a baby, rash.        Past Medical History:   Diagnosis Date    Allergy     Angio-edema     Breast cancer     Cancer     breast cancer     Past Surgical History:   Procedure Laterality Date    BREAST BIOPSY Right     BREAST SURGERY      double mastectomy w/reconstruction     SECTION      2 c-sections    COLONOSCOPY N/A 2018    Procedure: COLONOSCOPY;  Surgeon: Ellis Gutierres MD;  Location: [facility];  Service: Endoscopy;  Laterality: N/A;    ESOPHAGOGASTRODUODENOSCOPY N/A 10/11/2018    Procedure: EGD (ESOPHAGOGASTRODUODENOSCOPY);  Surgeon: Ellis Gutierres MD;  Location: [facility];  Service: Endoscopy;  Laterality: N/A;    FINGER SURGERY      left index finger    HYSTERECTOMY      MASTECTOMY      TONSILLECTOMY       Social History     Tobacco Use    Smoking status: Never    Smokeless tobacco: Never   Substance Use Topics    Alcohol use: No    Drug use: No        Review of Systems:    As noted in HPI                  Objective        Vitals:    23 1325   BP: 130/80       LIPIDS - LAST 2   Lab Results   Component Value Date    CHOL 244 (H) 2023    CHOL 276 (H) 2022    HDL 44 (L) 2023    HDL 49 (L) 2022    LDLCALC 167 (H) 2023    LDLCALC 198 (H) 2022    TRIG 177 (H) 2023    TRIG 138 2022    CHOLHDL 5.5 (H) 2023    CHOLHDL 5.6 (H) 2022       CBC - LAST  "2  Lab Results   Component Value Date    WBC 3.9 09/13/2021    WBC 3.7 (L) 08/27/2018    RBC 4.64 09/13/2021    RBC 4.72 08/27/2018    HGB 14.1 09/13/2021    HGB 14.2 08/27/2018    HCT 40.4 09/13/2021    HCT 41.1 08/27/2018    MCV 87.1 09/13/2021    MCV 87.1 08/27/2018    MCH 30.4 09/13/2021    MCH 30.1 08/27/2018    MCHC 34.9 09/13/2021    MCHC 34.5 08/27/2018    RDW 12.5 09/13/2021    RDW 12.8 08/27/2018     09/13/2021     08/27/2018    MPV 10.4 09/13/2021    MPV 10.5 08/27/2018    LYMPH 1,092 09/13/2021    LYMPH 28.0 09/13/2021    MONO 413 09/13/2021    MONO 10.6 09/13/2021    BASO 31 09/13/2021    BASO 30 08/27/2018       CHEMISTRY & LIVER FUNCTION - LAST 2  Lab Results   Component Value Date     07/14/2023     05/19/2022    K 4.2 07/14/2023    K 4.4 05/19/2022     07/14/2023     05/19/2022    CO2 28 07/14/2023    CO2 29 05/19/2022    BUN 11 07/14/2023    BUN 16 05/19/2022    CREATININE 0.60 07/14/2023    CREATININE 0.73 05/19/2022    GLU 81 07/14/2023    GLU 80 05/19/2022    CALCIUM 9.5 07/14/2023    CALCIUM 9.8 05/19/2022    ALBUMIN 4.5 07/14/2023    ALBUMIN 4.5 05/19/2022    PROT 7.2 07/14/2023    PROT 7.4 05/19/2022    ALKPHOS 69 08/27/2018    ALT 22 07/14/2023    ALT 25 05/19/2022    AST 22 07/14/2023    AST 25 05/19/2022    BILITOT 0.6 07/14/2023    BILITOT 0.7 05/19/2022        CARDIAC PROFILE - LAST 2  No results found for: "BNP", "CPK", "CPKMB", "LDH", "TROPONINI", "TROPONINIHS"     COAGULATION - LAST 2  No results found for: "LABPT", "INR", "APTT"    ENDOCRINE & PSA - LAST 2  Lab Results   Component Value Date    TSH 1.37 09/13/2021    TSH 1.24 08/27/2018        ECHOCARDIOGRAM RESULTS  Results for orders placed in visit on 04/13/22    Echo    Interpretation Summary  · The left ventricle is normal in size with mild concentric hypertrophy and hyperdynamic systolic function.  · The estimated ejection fraction is 75%.  · Normal right ventricular size with normal right " ventricular systolic function.  · Normal central venous pressure (3 mmHg).      CURRENT/PREVIOUS VISIT EKG  Results for orders placed or performed in visit on 01/25/22   IN OFFICE EKG 12-LEAD (to [place])    Collection Time: 01/25/22  4:20 PM    Narrative    Test Reason : E78.5,R06.02,R53.83,    Vent. Rate : 093 BPM     Atrial Rate : 093 BPM     P-R Int : 142 ms          QRS Dur : 088 ms      QT Int : 354 ms       P-R-T Axes : 060 -23 057 degrees     QTc Int : 440 ms    Normal sinus rhythm  Minimal voltage criteria for LVH, may be normal variant  Borderline Abnormal ECG  No previous ECGs available  Confirmed by Javon Villarreal MD ([id number]) on 2/7/2022 9:19:54 PM    Referred By:  RENETTA           Confirmed By:Javon Villarreal MD     No valid procedures specified.   Results for orders placed in visit on 04/13/22    Nuclear Stress Test    Interpretation Summary    The nuclear portion of this study will be reported separately.    The EKG portion of this study is negative for ischemia.    The patient reported no chest pain during the stress test.    There were no arrhythmias during stress.      PHYSICAL EXAM  CONSTITUTIONAL: Well built, well nourished in no apparent distress  NECK: no carotid bruit, no JVD  LUNGS: CTA  CHEST WALL: no tenderness  HEART: regular rate and rhythm, S1, S2 normal, no murmur, click, rub or gallop   ABDOMEN: soft, non-tender; bowel sounds normal; no masses,  no organomegaly  EXTREMITIES: Extremities normal, no edema, no calf tenderness noted  NEURO: AAO X 3    I HAVE REVIEWED :    The vital signs, nurses notes, and all the pertinent radiology and labs.        Current Outpatient Medications   Medication Instructions    betaxoloL (KERLONE) 10 mg, Oral, Daily    calcium carbonate (TUMS) 200 mg calcium (500 mg) chewable tablet 1 tablet, Oral, Daily, prn    calcium-vitamin D3 (OS-LIANA 500 + D3) 500 mg-5 mcg (200 unit) per tablet 1 tablet, Oral, 2 times daily with meals    famotidine (PEPCID) 20 mg, Oral, Daily  PRN, prn    fexofenadine (ALLEGRA) 180 mg, Oral, Daily, prn     multivitamin (THERAGRAN) per tablet 1 tablet, Oral, Daily          Assessment & Plan     Dyslipidemia  Last     Total 255  HDL 44  Never started crestor pharmacy issues  Will continue diet and exercise  Recheck in February  Start Fish oil and Red Yeast Rice.    Hypertension  BP stable   Continue Betaxolol.     Gastroesophageal reflux disease  Stableon medications.     Seasonal allergies  Stable          No follow-ups on file.

## 2024-03-28 ENCOUNTER — OFFICE VISIT (OUTPATIENT)
Dept: HEMATOLOGY/ONCOLOGY | Facility: CLINIC | Age: 59
End: 2024-03-28
Payer: OTHER GOVERNMENT

## 2024-03-28 VITALS
TEMPERATURE: 98 F | WEIGHT: 170 LBS | SYSTOLIC BLOOD PRESSURE: 154 MMHG | RESPIRATION RATE: 16 BRPM | DIASTOLIC BLOOD PRESSURE: 78 MMHG | BODY MASS INDEX: 30.11 KG/M2 | HEART RATE: 73 BPM

## 2024-03-28 DIAGNOSIS — Z79.811 AROMATASE INHIBITOR USE: ICD-10-CM

## 2024-03-28 DIAGNOSIS — Z12.31 SCREENING MAMMOGRAM FOR BREAST CANCER: ICD-10-CM

## 2024-03-28 DIAGNOSIS — Z85.3 HISTORY OF BREAST CANCER: Primary | Chronic | ICD-10-CM

## 2024-03-28 PROCEDURE — 99213 OFFICE O/P EST LOW 20 MIN: CPT | Mod: S$GLB,,, | Performed by: INTERNAL MEDICINE

## 2024-03-28 RX ORDER — AMPICILLIN TRIHYDRATE 250 MG
600 CAPSULE ORAL 2 TIMES DAILY
COMMUNITY

## 2024-03-28 NOTE — PROGRESS NOTES
PROGRESS NOTE    Subjective:       Patient ID: Enma Navarro is a 58 y.o. female.    Breast cancer 2008  Bilateral mastectomy/tissue reconstruc    Chief Complaint:  No chief complaint on file.  breast cancer follow up.     History of Present Illness:   Enma Navarro is a 58 y.o. female who presents for routine yearly follow up .     No new complaints at this time. She is feeling well.      mammo negative 10/4/2023    Family and Social history reviewed and is unchanged from 7/29/2014.       ROS:  Review of Systems   Constitutional:  Negative for appetite change, fever and unexpected weight change.   HENT:  Negative for mouth sores.    Eyes:  Negative for visual disturbance.   Respiratory:  Negative for cough and shortness of breath.    Cardiovascular:  Negative for chest pain and leg swelling.   Gastrointestinal:  Negative for abdominal pain, blood in stool and diarrhea.   Genitourinary:  Negative for frequency and hematuria.   Musculoskeletal:  Negative for back pain.   Skin:  Negative for rash.   Neurological:  Negative for headaches.   Hematological:  Negative for adenopathy.   Psychiatric/Behavioral:  The patient is not nervous/anxious.           Current Outpatient Medications:     betaxoloL (KERLONE) 10 mg Tab, Take 1 tablet (10 mg total) by mouth once daily., Disp: 45 tablet, Rfl: 3    calcium carbonate (TUMS) 200 mg calcium (500 mg) chewable tablet, Take 1 tablet by mouth once daily. prn, Disp: , Rfl:     calcium-vitamin D3 (OS-LIANA 500 + D3) 500 mg-5 mcg (200 unit) per tablet, Take 1 tablet by mouth 2 (two) times daily with meals., Disp: , Rfl:     famotidine (PEPCID) 20 MG tablet, Take 20 mg by mouth daily as needed for Heartburn. prn, Disp: , Rfl:     fexofenadine (ALLEGRA) 180 MG tablet, Take 180 mg by mouth once daily. prn, Disp: , Rfl:     multivitamin (THERAGRAN) per tablet, Take 1 tablet by mouth once daily., Disp: , Rfl:     red yeast rice  600 mg Cap, Take 600 mg by mouth 2 (two) times a day., Disp: , Rfl:         Objective:       Physical Examination:     BP (!) 154/78   Pulse 73   Temp 98.2 °F (36.8 °C)   Resp 16   Wt 77.1 kg (170 lb)   BMI 30.11 kg/m²     Physical Exam  Constitutional:       Appearance: She is well-developed.   HENT:      Head: Normocephalic and atraumatic.      Right Ear: External ear normal.      Left Ear: External ear normal.   Eyes:      Conjunctiva/sclera: Conjunctivae normal.      Pupils: Pupils are equal, round, and reactive to light.   Neck:      Thyroid: No thyromegaly.      Trachea: No tracheal deviation.   Cardiovascular:      Rate and Rhythm: Normal rate and regular rhythm.      Heart sounds: Normal heart sounds.   Pulmonary:      Effort: Pulmonary effort is normal.      Breath sounds: Normal breath sounds.   Chest:       Abdominal:      General: Bowel sounds are normal. There is no distension.      Palpations: Abdomen is soft. There is no mass.      Tenderness: There is no abdominal tenderness.   Skin:     Findings: No rash.   Neurological:      Comments: Neuro intact througout   Psychiatric:         Behavior: Behavior normal.         Thought Content: Thought content normal.         Judgment: Judgment normal.         Labs:   No results found for this or any previous visit (from the past 336 hour(s)).  CMP  Sodium   Date Value Ref Range Status   07/14/2023 141 135 - 146 mmol/L Final     Potassium   Date Value Ref Range Status   07/14/2023 4.2 3.5 - 5.3 mmol/L Final     Chloride   Date Value Ref Range Status   07/14/2023 104 98 - 110 mmol/L Final     CO2   Date Value Ref Range Status   07/14/2023 28 20 - 32 mmol/L Final     Glucose   Date Value Ref Range Status   07/14/2023 81 65 - 99 mg/dL Final     Comment:                   Fasting reference interval          BUN   Date Value Ref Range Status   07/14/2023 11 7 - 25 mg/dL Final     Creatinine   Date Value Ref Range Status   07/14/2023 0.60 0.50 - 1.03 mg/dL Final  "    Calcium   Date Value Ref Range Status   07/14/2023 9.5 8.6 - 10.4 mg/dL Final     Total Protein   Date Value Ref Range Status   07/14/2023 7.2 6.1 - 8.1 g/dL Final     Albumin   Date Value Ref Range Status   07/14/2023 4.5 3.6 - 5.1 g/dL Final     Total Bilirubin   Date Value Ref Range Status   07/14/2023 0.6 0.2 - 1.2 mg/dL Final     Alkaline Phosphatase   Date Value Ref Range Status   08/27/2018 69 33 - 130 U/L Final     AST   Date Value Ref Range Status   07/14/2023 22 10 - 35 U/L Final     ALT   Date Value Ref Range Status   07/14/2023 22 6 - 29 U/L Final     eGFR if    Date Value Ref Range Status   05/19/2022 107 > OR = 60 mL/min/1.73m2 Final     eGFR if non    Date Value Ref Range Status   05/19/2022 93 > OR = 60 mL/min/1.73m2 Final     No results found for: "CEA"  No results found for: "PSA"        Assessment/Plan:     Problem List Items Addressed This Visit       History of breast cancer-2008 ; s/p bilateral masectomies with reconstruction form abdominal fat - Primary (Chronic)     Patient is doing well and remains FREDERICK.  Exam and mammogram are negative and she overall is doing well.  Will continue yearly follow up.      Will also place order for dexa which will be due in September.          Relevant Orders    DXA Bone Density Axial Skeleton 1 or more sites    Mammo Digital Screening Bilat w/ Felix     Other Visit Diagnoses       Screening mammogram for breast cancer        Relevant Orders    Mammo Digital Screening Bilat w/ Felix    Aromatase inhibitor use        Relevant Orders    DXA Bone Density Axial Skeleton 1 or more sites          Discussion:     Follow up in about 1 year (around 3/28/2025).      Electronically signed by Delbert Roca          "

## 2024-03-28 NOTE — ASSESSMENT & PLAN NOTE
Patient is doing well and remains FREDERICK.  Exam and mammogram are negative and she overall is doing well.  Will continue yearly follow up.      Will also place order for dexa which will be due in September.

## 2024-06-03 ENCOUNTER — TELEPHONE (OUTPATIENT)
Dept: GASTROENTEROLOGY | Facility: CLINIC | Age: 59
End: 2024-06-03
Payer: OTHER GOVERNMENT

## 2024-06-03 DIAGNOSIS — Z86.010 HISTORY OF COLONIC POLYPS: Primary | ICD-10-CM

## 2024-06-03 NOTE — TELEPHONE ENCOUNTER
----- Message from Aparna Hernandez sent at 6/3/2024  9:44 AM CDT -----  Contact: Patient  Type:  Appointment Request    Caller is requesting a sooner appointment.  Caller declined first available appointment listed below.  Caller will not accept being placed on the waitlist and is requesting a message be sent to doctor.    Name of Caller:  Patient  When is the first available appointment?  N/A    Would the patient rather a call back or a response via [facility]?   Call back  Best Call Back Number:  [phone]    Additional Information:   States she would like to speak with someone to schedule a colonoscopy - please call - thank you

## 2024-07-08 ENCOUNTER — TELEPHONE (OUTPATIENT)
Dept: GASTROENTEROLOGY | Facility: CLINIC | Age: 59
End: 2024-07-08
Payer: OTHER GOVERNMENT

## 2024-07-08 NOTE — TELEPHONE ENCOUNTER
----- Message from Yoana Singleton sent at 7/8/2024  3:13 PM CDT -----  Regarding: Call back  Type:  Needs Medical Advice    Who Called: Pt    Would the patient rather a call back or a response via [facility]? Call back    Best Call Back Number: . [phone]      Additional Information: Pt have colonoscopy 8/9/24 and is requesting a callback to reschedule . Thank you

## 2024-09-27 DIAGNOSIS — E07.9 DISEASE OF THYROID GLAND: Primary | ICD-10-CM

## 2024-10-07 ENCOUNTER — ANESTHESIA (OUTPATIENT)
Dept: ENDOSCOPY | Facility: HOSPITAL | Age: 59
End: 2024-10-07
Payer: OTHER GOVERNMENT

## 2024-10-07 ENCOUNTER — HOSPITAL ENCOUNTER (OUTPATIENT)
Facility: HOSPITAL | Age: 59
Discharge: HOME | End: 2024-10-07
Attending: INTERNAL MEDICINE | Admitting: INTERNAL MEDICINE
Payer: OTHER GOVERNMENT

## 2024-10-07 ENCOUNTER — ANESTHESIA EVENT (OUTPATIENT)
Dept: ENDOSCOPY | Facility: HOSPITAL | Age: 59
End: 2024-10-07
Payer: OTHER GOVERNMENT

## 2024-10-07 VITALS
BODY MASS INDEX: 30.12 KG/M2 | SYSTOLIC BLOOD PRESSURE: 153 MMHG | OXYGEN SATURATION: 97 % | WEIGHT: 170 LBS | HEIGHT: 63 IN | TEMPERATURE: 98 F | HEART RATE: 65 BPM | RESPIRATION RATE: 18 BRPM | DIASTOLIC BLOOD PRESSURE: 70 MMHG

## 2024-10-07 DIAGNOSIS — K57.90 DIVERTICULOSIS: ICD-10-CM

## 2024-10-07 DIAGNOSIS — K64.8 INTERNAL HEMORRHOIDS: ICD-10-CM

## 2024-10-07 DIAGNOSIS — K63.5 POLYP OF COLON, UNSPECIFIED PART OF COLON, UNSPECIFIED TYPE: Primary | ICD-10-CM

## 2024-10-07 DIAGNOSIS — Z86.0100 HISTORY OF COLON POLYPS: ICD-10-CM

## 2024-10-07 PROCEDURE — 45385 COLONOSCOPY W/LESION REMOVAL: CPT | Mod: 33,,, | Performed by: INTERNAL MEDICINE

## 2024-10-07 PROCEDURE — 45380 COLONOSCOPY AND BIOPSY: CPT | Mod: 33,59 | Performed by: INTERNAL MEDICINE

## 2024-10-07 PROCEDURE — 25000003 PHARM REV CODE 250: Performed by: INTERNAL MEDICINE

## 2024-10-07 PROCEDURE — 45380 COLONOSCOPY AND BIOPSY: CPT | Mod: 33,59,, | Performed by: INTERNAL MEDICINE

## 2024-10-07 PROCEDURE — 37000009 HC ANESTHESIA EA ADD 15 MINS: Performed by: INTERNAL MEDICINE

## 2024-10-07 PROCEDURE — 37000008 HC ANESTHESIA 1ST 15 MINUTES: Performed by: INTERNAL MEDICINE

## 2024-10-07 PROCEDURE — 63600175 PHARM REV CODE 636 W HCPCS: Performed by: NURSE ANESTHETIST, CERTIFIED REGISTERED

## 2024-10-07 PROCEDURE — 45385 COLONOSCOPY W/LESION REMOVAL: CPT | Mod: 33 | Performed by: INTERNAL MEDICINE

## 2024-10-07 RX ORDER — SODIUM CHLORIDE 9 MG/ML
INJECTION, SOLUTION INTRAVENOUS CONTINUOUS
Status: DISCONTINUED | OUTPATIENT
Start: 2024-10-07 | End: 2024-10-07 | Stop reason: HOSPADM

## 2024-10-07 RX ORDER — PROPOFOL 10 MG/ML
INJECTION, EMULSION INTRAVENOUS
Status: COMPLETED
Start: 2024-10-07 | End: 2024-10-07

## 2024-10-07 RX ORDER — LIDOCAINE HYDROCHLORIDE 20 MG/ML
INJECTION, SOLUTION EPIDURAL; INFILTRATION; INTRACAUDAL; PERINEURAL
Status: DISCONTINUED
Start: 2024-10-07 | End: 2024-10-07 | Stop reason: HOSPADM

## 2024-10-07 RX ORDER — PROPOFOL 10 MG/ML
VIAL (ML) INTRAVENOUS
Status: DISCONTINUED | OUTPATIENT
Start: 2024-10-07 | End: 2024-10-07

## 2024-10-07 RX ORDER — LIDOCAINE HYDROCHLORIDE 20 MG/ML
INJECTION INTRAVENOUS
Status: DISCONTINUED | OUTPATIENT
Start: 2024-10-07 | End: 2024-10-07

## 2024-10-07 RX ADMIN — PROPOFOL 50 MG: 10 INJECTION, EMULSION INTRAVENOUS at 08:10

## 2024-10-07 RX ADMIN — SODIUM CHLORIDE: 9 INJECTION, SOLUTION INTRAVENOUS at 07:10

## 2024-10-07 RX ADMIN — LIDOCAINE HYDROCHLORIDE 100 MG: 20 INJECTION, SOLUTION INTRAVENOUS at 07:10

## 2024-10-07 RX ADMIN — PROPOFOL 100 MG: 10 INJECTION, EMULSION INTRAVENOUS at 07:10

## 2024-10-07 RX ADMIN — PROPOFOL 50 MG: 10 INJECTION, EMULSION INTRAVENOUS at 07:10

## 2024-10-07 NOTE — PLAN OF CARE
Vss, sera po fluids, denies pain, ambulates easily. IV removed, catheter intact. Discharge instructions provided and states understanding. States ready to go home.  Discharged from facility with family per wheelchair.   
nonweight-bearing

## 2024-10-07 NOTE — PROVATION PATIENT INSTRUCTIONS
Discharge Summary/Instructions after an Endoscopic Procedure  Patient Name: Enma Navarro  Patient MRN: [id number]  Patient YOB: 1965 Monday, October 7, 2024  Ellis Silver MD  Dear patient,  As a result of recent federal legislation (The Federal Cures Act), you may   receive lab or pathology results from your procedure in your [facility]   account before your physician is able to contact you. Your physician or   their representative will relay the results to you with their   recommendations at their soonest availability.  Thank you,  RESTRICTIONS:  During your procedure today, you received medications for sedation.  These   medications may affect your judgment, balance and coordination.  Therefore,   for 24 hours, you have the following restrictions:   - DO NOT drive a car, operate machinery, make legal/financial decisions,   sign important papers or drink alcohol.    ACTIVITY:  Today: no heavy lifting, straining or running due to procedural   sedation/anesthesia.  The following day: return to full activity including work.  DIET:  Eat and drink normally unless instructed otherwise.     TREATMENT FOR COMMON SIDE EFFECTS:  - Mild abdominal pain, nausea, belching, bloating or excessive gas:  rest,   eat lightly and use a heating pad.  - Sore Throat: treat with throat lozenges and/or gargle with warm salt   water.  - Because air was used during the procedure, expelling large amounts of air   from your rectum or belching is normal.  - If a bowel prep was taken, you may not have a bowel movement for 1-3 days.    This is normal.  SYMPTOMS TO WATCH FOR AND REPORT TO YOUR PHYSICIAN:  1. Abdominal pain or bloating, other than gas cramps.  2. Chest pain.  3. Back pain.  4. Signs of infection such as: chills or fever occurring within 24 hours   after the procedure.  5. Rectal bleeding, which would show as bright red, maroon, or black stools.   (A tablespoon of blood from the rectum is not serious, especially  if   hemorrhoids are present.)  6. Vomiting.  7. Weakness or dizziness.  GO DIRECTLY TO THE NEAREST EMERGENCY ROOM IF YOU HAVE ANY OF THE FOLLOWING:      Difficulty breathing              Chills and/or fever over 101 F   Persistent vomiting and/or vomiting blood   Severe abdominal pain   Severe chest pain   Black, tarry stools   Bleeding- more than one tablespoon   Any other symptom or condition that you feel may need urgent attention  Your doctor recommends these additional instructions:  If any biopsies were taken, your doctors clinic will contact you in 1 to 2   weeks with any results.  - Patient has a contact number available for emergencies.  The signs and   symptoms of potential delayed complications were discussed with the   patient.  Return to normal activities tomorrow.  Written discharge   instructions were provided to the patient.   - High fiber diet.   - Continue present medications.   - Await pathology results.   - Repeat colonoscopy in 5 years for surveillance.   - Discharge patient to home (ambulatory).   - Return to GI office PRN.  For questions, problems or results please call your physician - Ellis Silver MD at Work:  [phone].  [facility] PHONE NUMBER: [phone]  IF A COMPLICATION OR EMERGENCY SITUATION ARISES AND YOU ARE UNABLE TO REACH   YOUR PHYSICIAN - GO DIRECTLY TO THE EMERGENCY ROOM.  Ellis Silver MD  10/7/2024 8:14:23 AM  This report has been verified and signed electronically.  Dear patient,  As a result of recent federal legislation (The Federal Cures Act), you may   receive lab or pathology results from your procedure in your [facility]   account before your physician is able to contact you. Your physician or   their representative will relay the results to you with their   recommendations at their soonest availability.  Thank you,  PROVATION

## 2024-10-07 NOTE — ANESTHESIA PREPROCEDURE EVALUATION
10/07/2024  Enma Navarro is a 59 y.o., female.      Pre-op Assessment    I have reviewed the Patient Summary Reports.     I have reviewed the Nursing Notes. I have reviewed the NPO Status.   I have reviewed the Medications.     Review of Systems  Anesthesia Hx:  No problems with previous Anesthesia                Hematology/Oncology:                        --  Cancer in past history:       Breast              Cardiovascular:     Hypertension           hyperlipidemia          Shortness of Breath                    Hypertension         Pulmonary:      Shortness of breath                  Hepatic/GI:     GERD      Gerd          Neurological:  Neurology Normal                                          Physical Exam  General: Well nourished    Airway:  Mallampati: II   Mouth Opening: Normal  TM Distance: Normal  Neck ROM: Normal ROM        Anesthesia Plan  Type of Anesthesia, risks & benefits discussed:    Anesthesia Type: Gen Natural Airway  Intra-op Monitoring Plan: Standard ASA Monitors  Induction:  IV  Informed Consent: Informed consent signed with the Patient and all parties understand the risks and agree with anesthesia plan.  All questions answered.   ASA Score: 2    Ready For Surgery From Anesthesia Perspective.     .

## 2024-10-07 NOTE — TRANSFER OF CARE
"Anesthesia Transfer of Care Note    Patient: Enma Navarro    Procedure(s) Performed: Procedure(s) (LRB):  COLONOSCOPY (N/A)    Patient location: GI    Anesthesia Type: general    Transport from OR: Transported from OR on room air with adequate spontaneous ventilation    Post pain: adequate analgesia    Post assessment: no apparent anesthetic complications    Post vital signs: stable    Level of consciousness: sedated    Nausea/Vomiting: no nausea/vomiting    Complications: none    Transfer of care protocol was followed      Last vitals: Visit Vitals  /73 (BP Location: Left arm, Patient Position: Lying)   Pulse 74   Temp 36.7 °C (98.1 °F) (Skin)   Resp 16   Ht 5' 3" (1.6 m)   Wt 77.1 kg (170 lb)   SpO2 98%   Breastfeeding No   BMI 30.11 kg/m²     "

## 2024-10-09 NOTE — PROGRESS NOTES
Please advise patient that polyp pathology was reviewed and is benign and is the adenomatous type with high grade dysplasia which is definitely precancerous/risk factor for cancer.  Repeat colonoscopy recommended in no more than 3 years, not 5 as previously thought.  Place reminder in system for repeat colonoscopy.

## 2024-10-24 NOTE — ANESTHESIA POSTPROCEDURE EVALUATION
Anesthesia Post Evaluation    Patient: Enma Navarro    Procedure(s) Performed: Procedure(s) (LRB):  COLONOSCOPY (N/A)    Final Anesthesia Type: general      Patient location during evaluation: PACU  Patient participation: Yes- Able to Participate  Level of consciousness: awake  Post-procedure vital signs: reviewed and stable  Pain management: adequate  Airway patency: patent    PONV status at discharge: No PONV  Anesthetic complications: no      Cardiovascular status: blood pressure returned to baseline  Respiratory status: unassisted  Hydration status: euvolemic  Follow-up not needed.              Vitals Value Taken Time   /70 10/07/24 0832   Temp 36.5 °C (97.7 °F) 10/07/24 0827   Pulse 65 10/07/24 0832   Resp 18 10/07/24 0832   SpO2 97 % 10/07/24 0832         Event Time   Out of Recovery 08:43:32         Pain/Daniel Score: Daniel Score: 10 (10/7/2024  8:32 AM)          
Quality 431: Preventive Care And Screening: Unhealthy Alcohol Use - Screening: Patient not identified as an unhealthy alcohol user when screened for unhealthy alcohol use using a systematic screening method
Detail Level: Detailed
Quality 394a: Meningococcal Immunizations For Adolescents: Patient had one dose of meningococcal vaccine (serogroups A, C, W, Y) on or between the patient's 11th and 13th birthdays.
Quality 226: Preventive Care And Screening: Tobacco Use: Screening And Cessation Intervention: Patient screened for tobacco use and is an ex/non-smoker

## 2024-10-28 ENCOUNTER — HOSPITAL ENCOUNTER (OUTPATIENT)
Dept: RADIOLOGY | Facility: HOSPITAL | Age: 59
Discharge: HOME | End: 2024-10-28
Attending: INTERNAL MEDICINE
Payer: OTHER GOVERNMENT

## 2024-10-28 DIAGNOSIS — Z85.3 HISTORY OF BREAST CANCER: Chronic | ICD-10-CM

## 2024-10-28 DIAGNOSIS — Z12.31 SCREENING MAMMOGRAM FOR BREAST CANCER: ICD-10-CM

## 2024-10-28 DIAGNOSIS — Z79.811 AROMATASE INHIBITOR USE: ICD-10-CM

## 2024-10-28 PROCEDURE — 77063 BREAST TOMOSYNTHESIS BI: CPT | Mod: 26,,, | Performed by: RADIOLOGY

## 2024-10-28 PROCEDURE — 77067 SCR MAMMO BI INCL CAD: CPT | Mod: TC,PO

## 2024-10-28 PROCEDURE — 77080 DXA BONE DENSITY AXIAL: CPT | Mod: TC,PO

## 2024-10-28 PROCEDURE — 77067 SCR MAMMO BI INCL CAD: CPT | Mod: 26,,, | Performed by: RADIOLOGY

## 2024-10-28 PROCEDURE — 77080 DXA BONE DENSITY AXIAL: CPT | Mod: 26,,, | Performed by: RADIOLOGY

## 2024-10-28 PROCEDURE — 77063 BREAST TOMOSYNTHESIS BI: CPT | Mod: TC,PO

## 2024-11-19 ENCOUNTER — OFFICE VISIT (OUTPATIENT)
Dept: CARDIOLOGY | Facility: CLINIC | Age: 59
End: 2024-11-19
Payer: OTHER GOVERNMENT

## 2024-11-19 VITALS
HEIGHT: 63 IN | SYSTOLIC BLOOD PRESSURE: 122 MMHG | HEART RATE: 62 BPM | WEIGHT: 171.44 LBS | BODY MASS INDEX: 30.38 KG/M2 | DIASTOLIC BLOOD PRESSURE: 62 MMHG | OXYGEN SATURATION: 98 %

## 2024-11-19 DIAGNOSIS — I10 HYPERTENSION, UNSPECIFIED TYPE: ICD-10-CM

## 2024-11-19 DIAGNOSIS — E78.5 DYSLIPIDEMIA: Primary | ICD-10-CM

## 2024-11-19 PROCEDURE — 99214 OFFICE O/P EST MOD 30 MIN: CPT | Mod: S$GLB,,,

## 2024-11-19 PROCEDURE — 99999 PR PBB SHADOW E&M-EST. PATIENT-LVL III: CPT | Mod: PBBFAC,,,

## 2024-11-19 RX ORDER — BETAXOLOL 10 MG/1
10 TABLET, FILM COATED ORAL DAILY
Qty: 90 TABLET | Refills: 3 | Status: SHIPPED | OUTPATIENT
Start: 2024-11-19

## 2024-11-19 NOTE — ASSESSMENT & PLAN NOTE
Will be due for repeat lipid pane, - order placed.  May need to consider starting medication.    Encouraged heart healthy diet; exercise as tolerated.     Was previously on red yeast rice supplements however has not been taking for about a month now.

## 2024-11-19 NOTE — PROGRESS NOTES
Subjective:    Patient ID:  Enma Navarro is a 59 y.o. female who presents for follow-up.   Chief Complaint   Patient presents with    Hypertension       HPI:  Enma Navarro is here for follow-up visit/yearly. Denies chest pain or shortness of breath. Denies recent fever cough chills or congestion. Denies blood in the urine or blood in the stool.  Denies myalgias. Denies orthopnea or peripheral edema. Denies nausea vomiting or dyspepsia. No recent arm neck or jaw pain. No lightheadedness or dizziness.         Review of patient's allergies indicates:   Allergen Reactions    Penicillins      Reaction as a baby, rash.        Past Medical History:   Diagnosis Date    Allergy     Angio-edema     Breast cancer     Cancer     breast cancer    History of colonic polyps     Hypertension      Past Surgical History:   Procedure Laterality Date    BREAST BIOPSY Right     BREAST SURGERY      double mastectomy w/reconstruction     SECTION      2 c-sections    COLONOSCOPY N/A 2018    Procedure: COLONOSCOPY;  Surgeon: Ellis Gutierres MD;  Location: [facility];  Service: Endoscopy;  Laterality: N/A;    COLONOSCOPY N/A 10/7/2024    Procedure: COLONOSCOPY;  Surgeon: Ellis Gutierres MD;  [facility];  Service: Endoscopy;  Laterality: N/A;    ESOPHAGOGASTRODUODENOSCOPY N/A 10/11/2018    Procedure: EGD (ESOPHAGOGASTRODUODENOSCOPY);  Surgeon: Ellis Gutierres MD;  Location: [facility];  Service: Endoscopy;  Laterality: N/A;    FINGER SURGERY      left index finger    HYSTERECTOMY      MASTECTOMY      TONSILLECTOMY       Social History     Tobacco Use    Smoking status: Never    Smokeless tobacco: Never   Substance Use Topics    Alcohol use: No    Drug use: No     Family History   Problem Relation Name Age of Onset    Stroke Mother      Depression Mother      Hypertension Father      Breast cancer Paternal Aunt          Review of Systems:   Constitution: Negative for diaphoresis and fever.   HEENT:  Negative for nosebleeds.    Cardiovascular: Negative for chest pain       No dyspnea on exertion       No leg swelling        No palpitations  Respiratory: Negative for shortness of breath and wheezing.    Hematologic/Lymphatic: Negative for bleeding problem. Does not bruise/bleed easily.   Skin: Negative for color change and rash.   Musculoskeletal: Negative for falls and myalgias.   Gastrointestinal: Negative for hematemesis and hematochezia.   Genitourinary: Negative for hematuria.   Neurological: Negative for dizziness and light-headedness.   Psychiatric/Behavioral: Negative for altered mental status and memory loss.          Objective:        Vitals:    11/19/24 1549   BP: 122/62   Pulse: 62       Lab Results   Component Value Date    WBC 3.9 09/13/2021    HGB 14.1 09/13/2021    HCT 40.4 09/13/2021     09/13/2021    CHOL 244 (H) 07/14/2023    TRIG 177 (H) 07/14/2023    HDL 44 (L) 07/14/2023    ALT 22 07/14/2023    AST 22 07/14/2023     07/14/2023    K 4.2 07/14/2023     07/14/2023    CREATININE 0.60 07/14/2023    BUN 11 07/14/2023    CO2 28 07/14/2023    TSH 1.37 09/13/2021        ECHOCARDIOGRAM RESULTS  Results for orders placed in visit on 04/13/22    Echo    Interpretation Summary  · The left ventricle is normal in size with mild concentric hypertrophy and hyperdynamic systolic function.  · The estimated ejection fraction is 75%.  · Normal right ventricular size with normal right ventricular systolic function.  · Normal central venous pressure (3 mmHg).        CURRENT/PREVIOUS VISIT EKG  Results for orders placed or performed in visit on 12/01/23   IN OFFICE EKG 12-LEAD (to Bryan)    Collection Time: 12/01/23  1:36 PM    Narrative    Test Reason : I10,    Vent. Rate : 067 BPM     Atrial Rate : 067 BPM     P-R Int : 154 ms          QRS Dur : 090 ms      QT Int : 392 ms       P-R-T Axes : 062 -18 039 degrees     QTc Int : 414 ms    Normal sinus rhythm  Normal ECG  When compared with ECG of  25-JAN-2022 16:20,  No significant change was found  Confirmed by Adiel WONG, Mac SANCHEZ ([id number]) on 12/17/2023 1:33:49 PM    Referred By:             Confirmed By:Mac Chun MD     No valid procedures specified.   Results for orders placed in visit on 04/13/22    Nuclear Stress Test    Interpretation Summary    The nuclear portion of this study will be reported separately.    The EKG portion of this study is negative for ischemia.    The patient reported no chest pain during the stress test.    There were no arrhythmias during stress.      Physical Exam:  CONSTITUTIONAL: No fever, no chills  HEENT: Normocephalic, atraumatic,pupils reactive to light                 NECK:  No JVD no carotid bruit  CVS: S1S2+, RRR, no murmurs,   LUNGS: Clear  ABDOMEN: Soft, NT, BS+  EXTREMITIES: No cyanosis, edema  : No vargas catheter  NEURO: AAO X 3  PSY: Normal affect      Medication List with Changes/Refills   Current Medications    CALCIUM CARBONATE (TUMS) 200 MG CALCIUM (500 MG) CHEWABLE TABLET    Take 1 tablet by mouth once daily. prn    CALCIUM-VITAMIN D3 (OS-LIANA 500 + D3) 500 MG-5 MCG (200 UNIT) PER TABLET    Take 1 tablet by mouth 2 (two) times daily with meals.    FAMOTIDINE (PEPCID) 20 MG TABLET    Take 20 mg by mouth daily as needed for Heartburn. prn    FEXOFENADINE (ALLEGRA) 180 MG TABLET    Take 180 mg by mouth once daily. prn    MULTIVITAMIN (THERAGRAN) PER TABLET    Take 1 tablet by mouth once daily.    RED YEAST RICE 600 MG CAP    Take 600 mg by mouth 2 (two) times a day.   Changed and/or Refilled Medications    Modified Medication Previous Medication    BETAXOLOL (KERLONE) 10 MG TAB betaxoloL (KERLONE) 10 mg Tab       Take 1 tablet (10 mg total) by mouth once daily.    Take 1 tablet (10 mg total) by mouth once daily.             Assessment:       1. Dyslipidemia    2. Hypertension, unspecified type         Plan:     Problem List Items Addressed This Visit          Cardiac/Vascular    Dyslipidemia - Primary     Current Assessment & Plan     Will be due for repeat lipid pane, - order placed.  May need to consider starting medication.    Encouraged heart healthy diet; exercise as tolerated.     Was previously on red yeast rice supplements however has not been taking for about a month now.         Relevant Orders    Lipid Panel    Comprehensive Metabolic Panel    Hypertension    Current Assessment & Plan     Blood pressure stable today in clinic at 122/62.  Continue betaxolol 10 mg daily.  Encouraged low-sodium diet.         Relevant Orders    CBC Auto Differential       Follow up in about 1 year (around 11/19/2025).

## 2024-11-20 NOTE — ASSESSMENT & PLAN NOTE
Blood pressure stable today in clinic at 122/62.  Continue betaxolol 10 mg daily.  Encouraged low-sodium diet.

## 2025-01-16 ENCOUNTER — HOSPITAL ENCOUNTER (OUTPATIENT)
Dept: RADIOLOGY | Facility: HOSPITAL | Age: 60
Discharge: HOME | End: 2025-01-16
Attending: NURSE PRACTITIONER
Payer: OTHER GOVERNMENT

## 2025-01-16 DIAGNOSIS — E07.9 DISEASE OF THYROID GLAND: ICD-10-CM

## 2025-01-16 PROCEDURE — 76536 US EXAM OF HEAD AND NECK: CPT | Mod: 26,,, | Performed by: RADIOLOGY

## 2025-01-16 PROCEDURE — 76536 US EXAM OF HEAD AND NECK: CPT | Mod: TC,PO

## 2025-02-22 ENCOUNTER — OFFICE VISIT (OUTPATIENT)
Dept: URGENT CARE | Facility: CLINIC | Age: 60
End: 2025-02-22
Payer: OTHER GOVERNMENT

## 2025-02-22 VITALS
DIASTOLIC BLOOD PRESSURE: 89 MMHG | HEIGHT: 63 IN | HEART RATE: 75 BPM | SYSTOLIC BLOOD PRESSURE: 146 MMHG | BODY MASS INDEX: 30.3 KG/M2 | RESPIRATION RATE: 17 BRPM | TEMPERATURE: 99 F | WEIGHT: 171 LBS | OXYGEN SATURATION: 100 %

## 2025-02-22 DIAGNOSIS — R05.9 COUGH, UNSPECIFIED TYPE: ICD-10-CM

## 2025-02-22 DIAGNOSIS — J34.89 SINUS PRESSURE: ICD-10-CM

## 2025-02-22 DIAGNOSIS — R09.81 NASAL CONGESTION: ICD-10-CM

## 2025-02-22 DIAGNOSIS — J11.1 INFLUENZA: Primary | ICD-10-CM

## 2025-02-22 LAB
CTP QC/QA: YES
CTP QC/QA: YES
FLUAV AG NPH QL: POSITIVE
FLUBV AG NPH QL: NEGATIVE
SARS CORONAVIRUS 2 ANTIGEN: NEGATIVE

## 2025-02-22 RX ORDER — PROMETHAZINE HYDROCHLORIDE AND DEXTROMETHORPHAN HYDROBROMIDE 6.25; 15 MG/5ML; MG/5ML
5 SYRUP ORAL NIGHTLY PRN
Qty: 120 ML | Refills: 0 | Status: SHIPPED | OUTPATIENT
Start: 2025-02-22

## 2025-02-22 RX ORDER — OSELTAMIVIR PHOSPHATE 75 MG/1
75 CAPSULE ORAL 2 TIMES DAILY
Qty: 10 CAPSULE | Refills: 0 | Status: SHIPPED | OUTPATIENT
Start: 2025-02-22 | End: 2025-02-27

## 2025-02-22 RX ORDER — FLUTICASONE PROPIONATE 50 MCG
1 SPRAY, SUSPENSION (ML) NASAL DAILY
Qty: 9.9 ML | Refills: 0 | Status: SHIPPED | OUTPATIENT
Start: 2025-02-22

## 2025-02-22 NOTE — LETTER
February 22, 2025      [facility] [place]  [address]  [address]  [place] [address]  Phone: [phone]  Fax: [phone]       Patient: Enma Navarro   YOB: 1965  Date of Visit: 02/22/2025    To Whom It May Concern:    Geremias Navarro  was at [facility] on 02/22/2025. The patient may return to work/school on 02/26/2025 with no restrictions. If you have any questions or concerns, or if I can be of further assistance, please do not hesitate to contact me.    Sincerely,    Vicky Kelly, NP

## 2025-02-22 NOTE — PROGRESS NOTES
"Subjective:      Patient ID: Enma Navarro is a 59 y.o. female.    Vitals:  height is 5' 3" (1.6 m) and weight is 77.6 kg (171 lb). Her oral temperature is 98.6 °F (37 °C). Her blood pressure is 146/89 (abnormal) and her pulse is 75. Her respiration is 17 and oxygen saturation is 100%.     Chief Complaint: Sinus Problem    Pt states that yesterday she began to have cough, congestion, nasal drainage, headache and sinus pressure. Pt denies any sob or chest pain.     Sinus Problem  This is a new problem. The current episode started yesterday. The problem is unchanged. There has been no fever. Her pain is at a severity of 4/10. The pain is mild. Associated symptoms include congestion, coughing, headaches, a hoarse voice and sinus pressure. Past treatments include nothing. The treatment provided no relief.       Constitution: Positive for fatigue.   HENT:  Positive for congestion, postnasal drip, sinus pain and sinus pressure.    Neck: neck negative.   Cardiovascular: Negative.    Eyes: Negative.    Respiratory:  Positive for cough.    Gastrointestinal: Negative.    Endocrine: negative.   Genitourinary: Negative.    Musculoskeletal: Negative.    Skin: Negative.    Allergic/Immunologic: Negative.    Neurological:  Positive for headaches.   Hematologic/Lymphatic: Negative.    Psychiatric/Behavioral: Negative.        Objective:     Physical Exam   Constitutional: She is oriented to person, place, and time. She is cooperative. She does not appear ill. No distress.   HENT:   Head: Normocephalic and atraumatic.   Ears:   Right Ear: Tympanic membrane, external ear and ear canal normal.   Left Ear: Tympanic membrane, external ear and ear canal normal.   Nose: Congestion present. Right sinus exhibits maxillary sinus tenderness and frontal sinus tenderness. Left sinus exhibits maxillary sinus tenderness and frontal sinus tenderness.   Mouth/Throat: Mucous membranes are moist. Posterior oropharyngeal erythema present.   Eyes: " Conjunctivae are normal. Pupils are equal, round, and reactive to light. Extraocular movement intact   Neck: Neck supple. No neck rigidity present.   Cardiovascular: Normal rate, regular rhythm, normal heart sounds and normal pulses.   Pulmonary/Chest: Effort normal and breath sounds normal. No respiratory distress. She has no wheezes.   Abdominal: Normal appearance.   Musculoskeletal: Normal range of motion.         General: Normal range of motion.      Cervical back: She exhibits no tenderness.   Neurological: no focal deficit. She is alert, oriented to person, place, and time and at baseline.   Skin: Skin is warm and dry.   Psychiatric: Her behavior is normal. Mood, judgment and thought content normal.   Nursing note and vitals reviewed.      Assessment:     1. Influenza    2. Nasal congestion    3. Cough, unspecified type    4. Sinus pressure        Plan:       Influenza  -     oseltamivir (TAMIFLU) 75 MG capsule; Take 1 capsule (75 mg total) by mouth 2 (two) times daily. for 5 days  Dispense: 10 capsule; Refill: 0  -     fluticasone propionate (FLONASE) 50 mcg/actuation nasal spray; 1 spray (50 mcg total) by Each Nostril route once daily.  Dispense: 9.9 mL; Refill: 0    Nasal congestion  -     POCT Influenza A/B Rapid Antigen  -     SARS Coronavirus 2 Antigen, POCT Manual Read  -     fluticasone propionate (FLONASE) 50 mcg/actuation nasal spray; 1 spray (50 mcg total) by Each Nostril route once daily.  Dispense: 9.9 mL; Refill: 0    Cough, unspecified type  -     fluticasone propionate (FLONASE) 50 mcg/actuation nasal spray; 1 spray (50 mcg total) by Each Nostril route once daily.  Dispense: 9.9 mL; Refill: 0  -     promethazine-dextromethorphan (PROMETHAZINE-DM) 6.25-15 mg/5 mL Syrp; Take 5 mLs by mouth nightly as needed (cough).  Dispense: 120 mL; Refill: 0    Sinus pressure  -     fluticasone propionate (FLONASE) 50 mcg/actuation nasal spray; 1 spray (50 mcg total) by Each Nostril route once daily.   Dispense: 9.9 mL; Refill: 0

## 2025-03-25 ENCOUNTER — TELEPHONE (OUTPATIENT)
Facility: CLINIC | Age: 60
End: 2025-03-25
Payer: OTHER GOVERNMENT

## 2025-04-02 ENCOUNTER — TELEPHONE (OUTPATIENT)
Facility: CLINIC | Age: 60
End: 2025-04-02
Payer: OTHER GOVERNMENT

## 2025-04-02 NOTE — TELEPHONE ENCOUNTER
----- Message from Joanna sent at 4/2/2025  2:45 PM CDT -----  Type:  Appointment Request Name of Caller:Sudarshan is the first available appointment?No accessSymptoms:yearly check upWould the patient rather a call back or a response via [facility]? [employer] Call Back Number:[phone] Additional Information: Pt got her appt dates mixed up and needs to reschedule 04/01 yearly check up. Please call pt with further assistance. Thank you.

## 2025-04-04 ENCOUNTER — TELEPHONE (OUTPATIENT)
Facility: CLINIC | Age: 60
End: 2025-04-04
Payer: OTHER GOVERNMENT

## 2025-04-04 NOTE — TELEPHONE ENCOUNTER
----- Message from SADE Shirley sent at 4/2/2025  3:16 PM CDT -----  Joanna Bautista StaffCaller: Unspecified (Today,  2:45 PM)Type:  Appointment Request Name of Caller:ptWhen is the first available appointment?No accessSymptoms:yearly check upWould the patient rather a call back or a response via [facility]? [employer] Call Back Number:[phone] Information: Pt got her appt dates mixed up and needs to reschedule 04/01 yearly check up. Please call pt with further assistance. Thank you.

## 2025-04-10 ENCOUNTER — TELEPHONE (OUTPATIENT)
Facility: CLINIC | Age: 60
End: 2025-04-10
Payer: OTHER GOVERNMENT

## 2025-04-16 ENCOUNTER — OFFICE VISIT (OUTPATIENT)
Facility: CLINIC | Age: 60
End: 2025-04-16
Payer: OTHER GOVERNMENT

## 2025-04-16 VITALS
WEIGHT: 171.5 LBS | DIASTOLIC BLOOD PRESSURE: 71 MMHG | RESPIRATION RATE: 18 BRPM | HEART RATE: 75 BPM | TEMPERATURE: 98 F | BODY MASS INDEX: 30.38 KG/M2 | SYSTOLIC BLOOD PRESSURE: 134 MMHG

## 2025-04-16 DIAGNOSIS — Z85.3 HISTORY OF BREAST CANCER: Chronic | ICD-10-CM

## 2025-04-16 DIAGNOSIS — C80.1 MALIGNANT NEOPLASTIC DISEASE: Primary | ICD-10-CM

## 2025-04-16 PROCEDURE — 99999 PR PBB SHADOW E&M-EST. PATIENT-LVL III: CPT | Mod: PBBFAC,,, | Performed by: INTERNAL MEDICINE

## 2025-04-16 PROCEDURE — 99213 OFFICE O/P EST LOW 20 MIN: CPT | Mod: S$GLB,,, | Performed by: INTERNAL MEDICINE

## 2025-04-16 NOTE — PROGRESS NOTES
PROGRESS NOTE    Subjective:       Patient ID: Enma Navarro is a 59 y.o. female.    Breast cancer 2008  Bilateral mastectomy/tissue reconstruc    Chief Complaint:  No chief complaint on file.  breast cancer follow up.     History of Present Illness:   Enma Navarro is a 59 y.o. female who presents for routine yearly follow up .     No new complaints at this time. She is feeling well.      mammo negative 10/4/2023    Family and Social history reviewed and is unchanged from 7/29/2014.       ROS:  Review of Systems   Constitutional:  Negative for appetite change, fever and unexpected weight change.   HENT:  Negative for mouth sores.    Eyes:  Negative for visual disturbance.   Respiratory:  Negative for cough and shortness of breath.    Cardiovascular:  Negative for chest pain and leg swelling.   Gastrointestinal:  Negative for abdominal pain, blood in stool and diarrhea.   Genitourinary:  Negative for frequency and hematuria.   Musculoskeletal:  Negative for back pain.   Skin:  Negative for rash.   Neurological:  Negative for headaches.   Hematological:  Negative for adenopathy.   Psychiatric/Behavioral:  The patient is not nervous/anxious.           Current Outpatient Medications:     betaxoloL (KERLONE) 10 mg Tab, Take 1 tablet (10 mg total) by mouth once daily., Disp: 90 tablet, Rfl: 3    calcium-vitamin D3 (OS-LIANA 500 + D3) 500 mg-5 mcg (200 unit) per tablet, Take 1 tablet by mouth 2 (two) times daily with meals., Disp: , Rfl:     famotidine (PEPCID) 20 MG tablet, Take 20 mg by mouth daily as needed for Heartburn. prn, Disp: , Rfl:     fexofenadine (ALLEGRA) 180 MG tablet, Take 180 mg by mouth once daily. prn, Disp: , Rfl:     multivitamin (THERAGRAN) per tablet, Take 1 tablet by mouth once daily., Disp: , Rfl:     red yeast rice 600 mg Cap, Take 600 mg by mouth 2 (two) times a day., Disp: , Rfl:         Objective:       Physical Examination:     BP  134/71   Pulse 75   Temp 98.3 °F (36.8 °C)   Resp 18   Wt 77.8 kg (171 lb 8 oz)   BMI 30.38 kg/m²     Physical Exam  Constitutional:       Appearance: She is well-developed.   HENT:      Head: Normocephalic and atraumatic.      Right Ear: External ear normal.      Left Ear: External ear normal.   Eyes:      Conjunctiva/sclera: Conjunctivae normal.      Pupils: Pupils are equal, round, and reactive to light.   Neck:      Thyroid: No thyromegaly.      Trachea: No tracheal deviation.   Cardiovascular:      Rate and Rhythm: Normal rate and regular rhythm.      Heart sounds: Normal heart sounds.   Pulmonary:      Effort: Pulmonary effort is normal.      Breath sounds: Normal breath sounds.   Chest:       Abdominal:      General: Bowel sounds are normal. There is no distension.      Palpations: Abdomen is soft. There is no mass.      Tenderness: There is no abdominal tenderness.   Skin:     Findings: No rash.   Neurological:      Comments: Neuro intact througout   Psychiatric:         Behavior: Behavior normal.         Thought Content: Thought content normal.         Judgment: Judgment normal.         Labs:   No results found for this or any previous visit (from the past 2 weeks).  CMP  Sodium   Date Value Ref Range Status   07/14/2023 141 135 - 146 mmol/L Final     Potassium   Date Value Ref Range Status   07/14/2023 4.2 3.5 - 5.3 mmol/L Final     Chloride   Date Value Ref Range Status   07/14/2023 104 98 - 110 mmol/L Final     CO2   Date Value Ref Range Status   07/14/2023 28 20 - 32 mmol/L Final     Glucose   Date Value Ref Range Status   07/14/2023 81 65 - 99 mg/dL Final     Comment:                   Fasting reference interval          BUN   Date Value Ref Range Status   07/14/2023 11 7 - 25 mg/dL Final     Creatinine   Date Value Ref Range Status   07/14/2023 0.60 0.50 - 1.03 mg/dL Final     Calcium   Date Value Ref Range Status   07/14/2023 9.5 8.6 - 10.4 mg/dL Final     Total Protein   Date Value Ref Range  "Status   07/14/2023 7.2 6.1 - 8.1 g/dL Final     Albumin   Date Value Ref Range Status   07/14/2023 4.5 3.6 - 5.1 g/dL Final     Total Bilirubin   Date Value Ref Range Status   07/14/2023 0.6 0.2 - 1.2 mg/dL Final     Alkaline Phosphatase   Date Value Ref Range Status   08/27/2018 69 33 - 130 U/L Final     AST   Date Value Ref Range Status   07/14/2023 22 10 - 35 U/L Final     ALT   Date Value Ref Range Status   07/14/2023 22 6 - 29 U/L Final     eGFR if    Date Value Ref Range Status   05/19/2022 107 > OR = 60 mL/min/1.73m2 Final     eGFR if non    Date Value Ref Range Status   05/19/2022 93 > OR = 60 mL/min/1.73m2 Final     No results found for: "CEA"  No results found for: "PSA"        Assessment/Plan:     Problem List Items Addressed This Visit       History of breast cancer-2008 ; s/p bilateral masectomies with reconstruction form abdominal fat (Chronic)    Patient is doing well and she appears FREDERICK at this time.  Exam is negative and mammogram is up to date.  Will continue to see her yearly for exams and mammo.  Discussed this today.          Relevant Orders    Mammo Digital Screening Bilat w/ Felix (XPD)    RESOLVED: Malignant neoplastic disease - Primary       Discussion:     Follow up in about 1 year (around 4/16/2026).      Electronically signed by Delbert Roca          "